# Patient Record
Sex: MALE | Race: WHITE | Employment: OTHER | URBAN - METROPOLITAN AREA
[De-identification: names, ages, dates, MRNs, and addresses within clinical notes are randomized per-mention and may not be internally consistent; named-entity substitution may affect disease eponyms.]

---

## 2017-03-08 PROBLEM — K44.9 HIATAL HERNIA WITH GERD: Status: ACTIVE | Noted: 2017-03-08

## 2017-03-08 PROBLEM — K21.9 HIATAL HERNIA WITH GERD: Status: ACTIVE | Noted: 2017-03-08

## 2017-09-08 PROBLEM — J30.1 SEASONAL ALLERGIC RHINITIS DUE TO POLLEN: Status: ACTIVE | Noted: 2017-09-08

## 2017-09-10 PROBLEM — Z87.19 H/O ESOPHAGEAL SPASM: Status: ACTIVE | Noted: 2017-09-10

## 2018-09-18 PROBLEM — E11.22 TYPE 2 DIABETES MELLITUS WITH STAGE 3 CHRONIC KIDNEY DISEASE, WITHOUT LONG-TERM CURRENT USE OF INSULIN (HCC): Status: RESOLVED | Noted: 2018-09-18 | Resolved: 2018-09-18

## 2018-09-18 PROBLEM — R73.02 IGT (IMPAIRED GLUCOSE TOLERANCE): Status: ACTIVE | Noted: 2018-09-18

## 2018-09-18 PROBLEM — N18.30 TYPE 2 DIABETES MELLITUS WITH STAGE 3 CHRONIC KIDNEY DISEASE, WITHOUT LONG-TERM CURRENT USE OF INSULIN (HCC): Status: ACTIVE | Noted: 2018-09-18

## 2018-09-18 PROBLEM — E11.21 TYPE 2 DIABETES WITH NEPHROPATHY (HCC): Status: ACTIVE | Noted: 2018-09-18

## 2018-09-18 PROBLEM — E11.22 TYPE 2 DIABETES MELLITUS WITH STAGE 3 CHRONIC KIDNEY DISEASE, WITHOUT LONG-TERM CURRENT USE OF INSULIN (HCC): Status: ACTIVE | Noted: 2018-09-18

## 2018-09-18 PROBLEM — N18.30 TYPE 2 DIABETES MELLITUS WITH STAGE 3 CHRONIC KIDNEY DISEASE, WITHOUT LONG-TERM CURRENT USE OF INSULIN (HCC): Status: RESOLVED | Noted: 2018-09-18 | Resolved: 2018-09-18

## 2018-09-20 PROBLEM — M1A.09X0 CHRONIC IDIOPATHIC GOUT OF MULTIPLE SITES: Status: ACTIVE | Noted: 2018-09-20

## 2019-03-16 PROBLEM — Z13.6 SCREENING FOR AAA (ABDOMINAL AORTIC ANEURYSM): Status: ACTIVE | Noted: 2019-03-16

## 2019-03-16 PROBLEM — Z23 NEED FOR SHINGLES VACCINE: Status: ACTIVE | Noted: 2019-03-16

## 2019-03-28 ENCOUNTER — HOSPITAL ENCOUNTER (OUTPATIENT)
Dept: ULTRASOUND IMAGING | Age: 74
Discharge: HOME | End: 2019-03-28
Attending: FAMILY MEDICINE
Payer: COMMERCIAL

## 2019-03-28 DIAGNOSIS — Z13.6 SCREENING FOR AAA (AORTIC ABDOMINAL ANEURYSM): ICD-10-CM

## 2019-03-28 PROCEDURE — 93978 VASCULAR STUDY: CPT

## 2019-09-23 PROBLEM — Z13.6 SCREENING FOR AAA (ABDOMINAL AORTIC ANEURYSM): Status: RESOLVED | Noted: 2019-03-16 | Resolved: 2019-09-23

## 2020-03-22 PROBLEM — Z23 NEED FOR SHINGLES VACCINE: Status: RESOLVED | Noted: 2019-03-16 | Resolved: 2020-03-22

## 2020-03-23 PROBLEM — R60.0 EDEMA OF BOTH LEGS: Status: ACTIVE | Noted: 2020-03-23

## 2020-03-23 PROBLEM — Z28.21 REFUSED PNEUMOCOCCAL VACCINE: Status: ACTIVE | Noted: 2020-03-23

## 2021-03-31 PROBLEM — R60.0 EDEMA OF BOTH LEGS: Status: RESOLVED | Noted: 2020-03-23 | Resolved: 2021-03-31

## 2021-08-24 ENCOUNTER — APPOINTMENT (OUTPATIENT)
Dept: ULTRASOUND IMAGING | Age: 76
DRG: 418 | End: 2021-08-24
Attending: EMERGENCY MEDICINE
Payer: MEDICARE

## 2021-08-24 ENCOUNTER — HOSPITAL ENCOUNTER (INPATIENT)
Age: 76
LOS: 4 days | Discharge: HOME | DRG: 418 | End: 2021-08-28
Attending: EMERGENCY MEDICINE | Admitting: SURGERY
Payer: MEDICARE

## 2021-08-24 DIAGNOSIS — K81.0 ACUTE CHOLECYSTITIS: ICD-10-CM

## 2021-08-24 DIAGNOSIS — K81.9 CHOLECYSTITIS: Primary | ICD-10-CM

## 2021-08-24 PROBLEM — E80.6 HYPERBILIRUBINEMIA: Status: ACTIVE | Noted: 2021-08-24

## 2021-08-24 LAB
ALBUMIN SERPL-MCNC: 4.3 G/DL (ref 3.2–4.6)
ALBUMIN/GLOB SERPL: 1 {RATIO} (ref 1.2–3.5)
ALP SERPL-CCNC: 82 U/L (ref 50–136)
ALT SERPL-CCNC: 26 U/L (ref 12–65)
ANION GAP SERPL CALC-SCNC: 6 MMOL/L (ref 7–16)
AST SERPL-CCNC: 13 U/L (ref 15–37)
BACTERIA URNS QL MICRO: 0 /HPF
BASOPHILS # BLD: 0 K/UL (ref 0–0.2)
BASOPHILS NFR BLD: 0 % (ref 0–2)
BILIRUB SERPL-MCNC: 1.5 MG/DL (ref 0.2–1.1)
BUN SERPL-MCNC: 15 MG/DL (ref 8–23)
CALCIUM SERPL-MCNC: 9.5 MG/DL (ref 8.3–10.4)
CASTS URNS QL MICRO: NORMAL /LPF
CHLORIDE SERPL-SCNC: 106 MMOL/L (ref 98–107)
CO2 SERPL-SCNC: 24 MMOL/L (ref 21–32)
CREAT SERPL-MCNC: 1.36 MG/DL (ref 0.8–1.5)
DIFFERENTIAL METHOD BLD: ABNORMAL
EOSINOPHIL # BLD: 0 K/UL (ref 0–0.8)
EOSINOPHIL NFR BLD: 0 % (ref 0.5–7.8)
EPI CELLS #/AREA URNS HPF: NORMAL /HPF
ERYTHROCYTE [DISTWIDTH] IN BLOOD BY AUTOMATED COUNT: 12.5 % (ref 11.9–14.6)
GLOBULIN SER CALC-MCNC: 4.1 G/DL (ref 2.3–3.5)
GLUCOSE SERPL-MCNC: 156 MG/DL (ref 65–100)
HCT VFR BLD AUTO: 46.6 % (ref 41.1–50.3)
HGB BLD-MCNC: 15.7 G/DL (ref 13.6–17.2)
IMM GRANULOCYTES # BLD AUTO: 0.2 K/UL (ref 0–0.5)
IMM GRANULOCYTES NFR BLD AUTO: 1 % (ref 0–5)
LIPASE SERPL-CCNC: 63 U/L (ref 73–393)
LYMPHOCYTES # BLD: 1 K/UL (ref 0.5–4.6)
LYMPHOCYTES NFR BLD: 6 % (ref 13–44)
MCH RBC QN AUTO: 32.2 PG (ref 26.1–32.9)
MCHC RBC AUTO-ENTMCNC: 33.7 G/DL (ref 31.4–35)
MCV RBC AUTO: 95.5 FL (ref 79.6–97.8)
MONOCYTES # BLD: 1.7 K/UL (ref 0.1–1.3)
MONOCYTES NFR BLD: 10 % (ref 4–12)
NEUTS SEG # BLD: 14.2 K/UL (ref 1.7–8.2)
NEUTS SEG NFR BLD: 83 % (ref 43–78)
NRBC # BLD: 0 K/UL (ref 0–0.2)
PLATELET # BLD AUTO: 183 K/UL (ref 150–450)
PLATELET COMMENTS,PCOM: ADEQUATE
PMV BLD AUTO: 11.4 FL (ref 9.4–12.3)
POTASSIUM SERPL-SCNC: 3.9 MMOL/L (ref 3.5–5.1)
PROT SERPL-MCNC: 8.4 G/DL (ref 6.3–8.2)
RBC # BLD AUTO: 4.88 M/UL (ref 4.23–5.6)
RBC #/AREA URNS HPF: NORMAL /HPF
RBC MORPH BLD: ABNORMAL
SODIUM SERPL-SCNC: 136 MMOL/L (ref 138–145)
WBC # BLD AUTO: 17.1 K/UL (ref 4.3–11.1)
WBC MORPH BLD: ABNORMAL
WBC URNS QL MICRO: NORMAL /HPF

## 2021-08-24 PROCEDURE — 65270000029 HC RM PRIVATE

## 2021-08-24 PROCEDURE — 81015 MICROSCOPIC EXAM OF URINE: CPT

## 2021-08-24 PROCEDURE — 83690 ASSAY OF LIPASE: CPT

## 2021-08-24 PROCEDURE — 80053 COMPREHEN METABOLIC PANEL: CPT

## 2021-08-24 PROCEDURE — 85025 COMPLETE CBC W/AUTO DIFF WBC: CPT

## 2021-08-24 PROCEDURE — 74011250636 HC RX REV CODE- 250/636: Performed by: NURSE PRACTITIONER

## 2021-08-24 PROCEDURE — 76705 ECHO EXAM OF ABDOMEN: CPT

## 2021-08-24 PROCEDURE — 74011000258 HC RX REV CODE- 258: Performed by: NURSE PRACTITIONER

## 2021-08-24 PROCEDURE — 99283 EMERGENCY DEPT VISIT LOW MDM: CPT

## 2021-08-24 RX ORDER — HYDROMORPHONE HYDROCHLORIDE 1 MG/ML
0.5 INJECTION, SOLUTION INTRAMUSCULAR; INTRAVENOUS; SUBCUTANEOUS
Status: DISCONTINUED | OUTPATIENT
Start: 2021-08-24 | End: 2021-08-28 | Stop reason: HOSPADM

## 2021-08-24 RX ORDER — SODIUM CHLORIDE 0.9 % (FLUSH) 0.9 %
5-10 SYRINGE (ML) INJECTION EVERY 8 HOURS
Status: DISCONTINUED | OUTPATIENT
Start: 2021-08-24 | End: 2021-08-28 | Stop reason: HOSPADM

## 2021-08-24 RX ORDER — SODIUM CHLORIDE, SODIUM LACTATE, POTASSIUM CHLORIDE, CALCIUM CHLORIDE 600; 310; 30; 20 MG/100ML; MG/100ML; MG/100ML; MG/100ML
50 INJECTION, SOLUTION INTRAVENOUS CONTINUOUS
Status: DISCONTINUED | OUTPATIENT
Start: 2021-08-24 | End: 2021-08-28 | Stop reason: HOSPADM

## 2021-08-24 RX ORDER — AMLODIPINE BESYLATE 10 MG/1
10 TABLET ORAL DAILY
Status: DISCONTINUED | OUTPATIENT
Start: 2021-08-25 | End: 2021-08-28 | Stop reason: HOSPADM

## 2021-08-24 RX ORDER — SODIUM CHLORIDE 0.9 % (FLUSH) 0.9 %
5-10 SYRINGE (ML) INJECTION AS NEEDED
Status: DISCONTINUED | OUTPATIENT
Start: 2021-08-24 | End: 2021-08-28 | Stop reason: HOSPADM

## 2021-08-24 RX ORDER — ONDANSETRON 2 MG/ML
4 INJECTION INTRAMUSCULAR; INTRAVENOUS
Status: DISCONTINUED | OUTPATIENT
Start: 2021-08-24 | End: 2021-08-28 | Stop reason: HOSPADM

## 2021-08-24 RX ADMIN — PIPERACILLIN SODIUM AND TAZOBACTAM SODIUM 3.38 G: 3; .375 INJECTION, POWDER, LYOPHILIZED, FOR SOLUTION INTRAVENOUS at 22:34

## 2021-08-24 RX ADMIN — Medication 10 ML: at 22:34

## 2021-08-24 RX ADMIN — SODIUM CHLORIDE, SODIUM LACTATE, POTASSIUM CHLORIDE, AND CALCIUM CHLORIDE 100 ML/HR: 600; 310; 30; 20 INJECTION, SOLUTION INTRAVENOUS at 20:05

## 2021-08-24 NOTE — ROUTINE PROCESS
TRANSFER - OUT REPORT:    Verbal report given to Maria Eugenia Peterson on Kathleene Diss  being transferred to 1 for routine progression of care       Report consisted of patients Situation, Background, Assessment and   Recommendations(SBAR). Information from the following report(s) SBAR, ED Summary, [facility] and Recent Results was reviewed with the receiving nurse. Lines:   Peripheral IV 08/24/21 Left Antecubital (Active)   Site Assessment Clean, dry, & intact 08/24/21 1619   Dressing Status Clean, dry, & intact 08/24/21 1619        Opportunity for questions and clarification was provided.       Patient transported with:   [place]

## 2021-08-24 NOTE — ED NOTES
[facility] care upon [facility] change. Patient care transferred at this time.

## 2021-08-24 NOTE — ED TRIAGE NOTES
Pt arrives via POV c/o upper abd discomfort, hx of hernia. Reports pain started Sunday.  Denies n/v/d.

## 2021-08-24 NOTE — ED PROVIDER NOTES
56-year-old male presenting for right upper quadrant and epigastric pain. He has had a couple of episodes of pain over the past 48 hours but the worst was overnight last night into this morning. Tells me it lasted for some hours but is now beginning to ease off little bit. Did not really have any nausea or vomiting with it. He has had these episodes occasionally over the years but never this badly. He took some Tylenol with minimal relief. Does report that he has \"a hernia\" and then points to his epigastric area but still has his gallbladder. The history is provided by the patient. Abdominal Pain   This is a new problem. The current episode started yesterday. The problem occurs hourly. The problem has been gradually improving. The pain is associated with an unknown factor. The pain is located in the epigastric region. The quality of the pain is sharp and shooting. The pain is at a severity of 6/10. The pain is severe. Pertinent negatives include no anorexia, no fever, no belching, no diarrhea, no flatus, no hematochezia, no melena, no nausea, no vomiting, no constipation, no dysuria and no chest pain. Nothing worsens the pain. The pain is relieved by nothing. Past workup includes no CT scan, no ultrasound, no surgery, no esophagogastroduodenoscopy, no UGI, no colonoscopy and no barium enema. The patient's surgical history non-contributory.        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    Essential hypertension, benign 2/4/2015    GERD (gastroesophageal reflux disease) 2/4/2015    Gout 2/4/2015    Hypercholesterolemia 2/4/2015    Hypertrophy (benign) of prostate 2/4/2015    Without urinary obstruction and other lower urinary tract symptoms (LUTS)    Hypothyroidism (acquired) 2/4/2015    Impaired glucose tolerance 2/4/2015    Vitamin D deficiency 2015       Past Surgical History:   Procedure Laterality Date    HX LITHOTRIPSY           Family History:   Problem Relation Age of Onset    Hypertension Mother     No Known Problems Father        Social History     Socioeconomic History    Marital status: SINGLE     Spouse name: Not on file    Number of children: Not on file    Years of education: Not on file    Highest education level: Not on file   Occupational History    Not on file   Tobacco Use    Smoking status: Former Smoker     Packs/day: 0.25     Years: 30.00     Pack years: 7.50     Types: Cigarettes     Quit date: 2005     Years since quittin.3    Smokeless tobacco: Never Used   Substance and Sexual Activity    Alcohol use: Yes     Alcohol/week: 7.0 standard drinks     Types: 7 Standard drinks or equivalent per week     Comment: one glass wine per night    Drug use: No    Sexual activity: Not on file   Other Topics Concern    Not on file   Social History Narrative    Not on file     Social Determinants of Health     Financial Resource Strain:     Difficulty of Paying Living Expenses:    Food Insecurity:     Worried About Running Out of Food in the Last Year:     Ran Out of Food in the Last Year:    Transportation Needs:     Lack of Transportation (Medical):  Lack of Transportation (Non-Medical):    Physical Activity:     Days of Exercise per Week:     Minutes of Exercise per Session:    Stress:     Feeling of Stress :    Social Connections:     Frequency of Communication with Friends and Family:     Frequency of Social Gatherings with Friends and Family:     Attends Yazidism Services:     Active Member of Clubs or Organizations:     Attends Club or Organization Meetings:     Marital Status:    Intimate Partner Violence:     Fear of Current or Ex-Partner:     Emotionally Abused:     Physically Abused:     Sexually Abused:           ALLERGIES: Plavix [clopidogrel]    Review of Systems Constitutional: Negative for fever. Cardiovascular: Negative for chest pain. Gastrointestinal: Positive for abdominal pain. Negative for anorexia, constipation, diarrhea, flatus, hematochezia, melena, nausea and vomiting. Genitourinary: Negative for dysuria. All other systems reviewed and are negative. Vitals:    08/24/21 1618   BP: (!) 145/88   Pulse: (!) 108   Resp: 19   Temp: 98.7 °F (37.1 °C)   SpO2: 96%   Weight: 79.4 kg (175 lb)   Height: 6' 1\" (1.854 m)            Physical Exam  Vitals and nursing note reviewed. Constitutional:       Appearance: He is well-developed. HENT:      Head: Normocephalic and atraumatic. Eyes:      Conjunctiva/sclera: Conjunctivae normal.      Pupils: Pupils are equal, round, and reactive to light. Cardiovascular:      Rate and Rhythm: Normal rate and regular rhythm. Heart sounds: Normal heart sounds. Pulmonary:      Effort: Pulmonary effort is normal.      Breath sounds: Normal breath sounds. Abdominal:      General: Bowel sounds are normal.      Palpations: Abdomen is soft. Tenderness: There is abdominal tenderness in the epigastric area. Musculoskeletal:         General: No deformity. Normal range of motion. Cervical back: Normal range of motion and neck supple. Skin:     General: Skin is warm and dry. Neurological:      Mental Status: He is alert and oriented to person, place, and time. Cranial Nerves: No cranial nerve deficit. Psychiatric:         Behavior: Behavior normal.          MDM  Number of Diagnoses or Management Options  Cholecystitis  Diagnosis management comments: 54-year-old male presenting for epigastric pain. Given the colicky nature I am concerned is biliary colic versus cholecystitis versus choledocholithiasis. Basic labs drawn and will get an ultrasound of the gallbladder.        Amount and/or Complexity of Data Reviewed  Clinical lab tests: ordered and reviewed (Results for orders placed or performed during the hospital encounter of 08/24/21  -CBC WITH AUTOMATED DIFF       Result                      Value             Ref Range           WBC                         17.1 (H)          4.3 - 11.1 K*       RBC                         4.88              4.23 - 5.6 M*       HGB                         15.7              13.6 - 17.2 *       HCT                         46.6              41.1 - 50.3 %       MCV                         95.5              79.6 - 97.8 *       MCH                         32.2              26.1 - 32.9 *       MCHC                        33.7              31.4 - 35.0 *       RDW                         12.5              11.9 - 14.6 %       PLATELET                    183               150 - 450 K/*       MPV                         11.4              9.4 - 12.3 FL       ABSOLUTE NRBC               0.00              0.0 - 0.2 K/*       NEUTROPHILS                 83 (H)            43 - 78 %           LYMPHOCYTES                 6 (L)             13 - 44 %           MONOCYTES                   10                4.0 - 12.0 %        EOSINOPHILS                 0 (L)             0.5 - 7.8 %         BASOPHILS                   0                 0.0 - 2.0 %         IMMATURE GRANULOCYTES       1                 0.0 - 5.0 %         ABS. NEUTROPHILS            14.2 (H)          1.7 - 8.2 K/*       ABS. LYMPHOCYTES            1.0               0.5 - 4.6 K/*       ABS. MONOCYTES              1.7 (H)           0.1 - 1.3 K/*       ABS. EOSINOPHILS            0.0               0.0 - 0.8 K/*       ABS. BASOPHILS              0.0               0.0 - 0.2 K/*       ABS. IMM.  GRANS.            0.2               0.0 - 0.5 K/*       RBC COMMENTS                                                  NORMOCYTIC/NORMOCHROMIC       WBC COMMENTS                                                  Result Confirmed By Smear       PLATELET COMMENTS           ADEQUATE                              DF                          AUTOMATED -METABOLIC PANEL, COMPREHENSIVE       Result                      Value             Ref Range           Sodium                      136 (L)           138 - 145 mm*       Potassium                   3.9               3.5 - 5.1 mm*       Chloride                    106               98 - 107 mmo*       CO2                         24                21 - 32 mmol*       Anion gap                   6 (L)             7 - 16 mmol/L       Glucose                     156 (H)           65 - 100 mg/*       BUN                         15                8 - 23 MG/DL        Creatinine                  1.36              0.8 - 1.5 MG*       GFR est AA                  >60               >60 ml/min/1*       GFR est non-AA              54 (L)            >60 ml/min/1*       Calcium                     9.5               8.3 - 10.4 M*       Bilirubin, total            1.5 (H)           0.2 - 1.1 MG*       ALT (SGPT)                  26                12 - 65 U/L         AST (SGOT)                  13 (L)            15 - 37 U/L         Alk. phosphatase            82                50 - 136 U/L        Protein, total              8.4 (H)           6.3 - 8.2 g/*       Albumin                     4.3               3.2 - 4.6 g/*       Globulin                    4.1 (H)           2.3 - 3.5 g/*       A-G Ratio                   1.0 (L)           1.2 - 3.5      -LIPASE       Result                      Value             Ref Range           Lipase                      63 (L)            73 - 393 U/L   )  Tests in the radiology section of CPT®: ordered and reviewed (US ABD LTD    Result Date: 8/24/2021  RIGHT UPPER QUADRANT ULTRASOUND 8/24/2021 HISTORY: ruq pain that comes and goes; ; TECHNIQUE: Sonographic imaging of the right upper quadrant was performed. COMPARISON: None FINDINGS: The pancreatic head is not well visualized because it is partially obscured by bowel gas.  The gallbladder is distended and contains multiple shadowing stones. Edema is present on the gallbladder wall which is thickened up to 6 mm. The sonographic Winkler's sign is absent. The common bile duct is 2.7 mm in diameter. There is no biliary ductal dilatation. There is a cyst in the left hepatic lobe that measures 3.6 cm in diameter. The right kidney is 10 cm in length. There is no hydronephrosis. There are multiple nonobstructing stones in the right renal collecting system. The distal aorta is 1.8cm in diameter. The IVC is patent. 1. Cholelithiasis with edematous thickened gallbladder wall. Findings are concerning for acute cholecystitis. 2. Nephrolithiasis.     )  Tests in the medicine section of CPT®: ordered and reviewed  Discuss the patient with other providers: yes (Consulted surgery with the above information of elevated white count, elevated bilirubin and ultrasound findings.)  Independent visualization of images, tracings, or specimens: yes (Reviewed ultrasound)    Risk of Complications, Morbidity, and/or Mortality  Presenting problems: high  Diagnostic procedures: high  Management options: high  General comments: It appears that the surgery team has decided to admit the patient for acute cholecystitis. I personally reviewed the patient's vital signs, laboratory tests, and/or radiological findings. I discussed these findings with the patient and their significance. I answered all questions and explained that given these findings there is significant concern for increased morbidity and/or mortality without immediate intervention.   As a result, I recommended admission to the hospital, consulted the appropriate service, and transitioned care to that service in improved condition      Patient Progress  Patient progress: improved    ED Course as of Aug 24 1907   June Aug 24, 2021   1907 Consulted surgery due to concern for acute cholecystitis given elevated white count, good story and elevated bilirubin with ultrasound findings    [JS]      ED Course User Index  [JS] Pema Mcallister MD       Procedures

## 2021-08-25 LAB
ALBUMIN SERPL-MCNC: 3.5 G/DL (ref 3.2–4.6)
ALBUMIN/GLOB SERPL: 0.9 {RATIO} (ref 1.2–3.5)
ALP SERPL-CCNC: 77 U/L (ref 50–136)
ALT SERPL-CCNC: 35 U/L (ref 12–65)
ANION GAP SERPL CALC-SCNC: 4 MMOL/L (ref 7–16)
AST SERPL-CCNC: 29 U/L (ref 15–37)
BILIRUB SERPL-MCNC: 2.1 MG/DL (ref 0.2–1.1)
BUN SERPL-MCNC: 18 MG/DL (ref 8–23)
CALCIUM SERPL-MCNC: 9 MG/DL (ref 8.3–10.4)
CHLORIDE SERPL-SCNC: 108 MMOL/L (ref 98–107)
CO2 SERPL-SCNC: 26 MMOL/L (ref 21–32)
CREAT SERPL-MCNC: 1.41 MG/DL (ref 0.8–1.5)
ERYTHROCYTE [DISTWIDTH] IN BLOOD BY AUTOMATED COUNT: 12.7 % (ref 11.9–14.6)
GLOBULIN SER CALC-MCNC: 3.7 G/DL (ref 2.3–3.5)
GLUCOSE SERPL-MCNC: 137 MG/DL (ref 65–100)
HCT VFR BLD AUTO: 43.3 % (ref 41.1–50.3)
HGB BLD-MCNC: 14.4 G/DL (ref 13.6–17.2)
MCH RBC QN AUTO: 31.7 PG (ref 26.1–32.9)
MCHC RBC AUTO-ENTMCNC: 33.3 G/DL (ref 31.4–35)
MCV RBC AUTO: 95.4 FL (ref 79.6–97.8)
NRBC # BLD: 0 K/UL (ref 0–0.2)
PLATELET # BLD AUTO: 159 K/UL (ref 150–450)
PMV BLD AUTO: 11.8 FL (ref 9.4–12.3)
POTASSIUM SERPL-SCNC: 3.9 MMOL/L (ref 3.5–5.1)
PROT SERPL-MCNC: 7.2 G/DL (ref 6.3–8.2)
RBC # BLD AUTO: 4.54 M/UL (ref 4.23–5.6)
SODIUM SERPL-SCNC: 138 MMOL/L (ref 136–145)
WBC # BLD AUTO: 13.6 K/UL (ref 4.3–11.1)

## 2021-08-25 PROCEDURE — 74011250637 HC RX REV CODE- 250/637: Performed by: NURSE PRACTITIONER

## 2021-08-25 PROCEDURE — 85027 COMPLETE CBC AUTOMATED: CPT

## 2021-08-25 PROCEDURE — 74011000258 HC RX REV CODE- 258: Performed by: NURSE PRACTITIONER

## 2021-08-25 PROCEDURE — 36415 COLL VENOUS BLD VENIPUNCTURE: CPT

## 2021-08-25 PROCEDURE — 99223 1ST HOSP IP/OBS HIGH 75: CPT | Performed by: SURGERY

## 2021-08-25 PROCEDURE — 80053 COMPREHEN METABOLIC PANEL: CPT

## 2021-08-25 PROCEDURE — 65270000029 HC RM PRIVATE

## 2021-08-25 PROCEDURE — 74011250636 HC RX REV CODE- 250/636: Performed by: NURSE PRACTITIONER

## 2021-08-25 RX ORDER — ACETAMINOPHEN 325 MG/1
650 TABLET ORAL
Status: DISCONTINUED | OUTPATIENT
Start: 2021-08-25 | End: 2021-08-28 | Stop reason: HOSPADM

## 2021-08-25 RX ORDER — PANTOPRAZOLE SODIUM 40 MG/1
40 TABLET, DELAYED RELEASE ORAL
Status: DISCONTINUED | OUTPATIENT
Start: 2021-08-25 | End: 2021-08-28 | Stop reason: HOSPADM

## 2021-08-25 RX ORDER — VALSARTAN 320 MG/1
320 TABLET ORAL DAILY
Status: DISCONTINUED | OUTPATIENT
Start: 2021-08-25 | End: 2021-08-28 | Stop reason: HOSPADM

## 2021-08-25 RX ADMIN — PANTOPRAZOLE SODIUM 40 MG: 40 TABLET, DELAYED RELEASE ORAL at 09:25

## 2021-08-25 RX ADMIN — PIPERACILLIN SODIUM AND TAZOBACTAM SODIUM 3.38 G: 3; .375 INJECTION, POWDER, LYOPHILIZED, FOR SOLUTION INTRAVENOUS at 19:59

## 2021-08-25 RX ADMIN — PIPERACILLIN SODIUM AND TAZOBACTAM SODIUM 3.38 G: 3; .375 INJECTION, POWDER, LYOPHILIZED, FOR SOLUTION INTRAVENOUS at 12:53

## 2021-08-25 RX ADMIN — HYDROMORPHONE HYDROCHLORIDE 0.5 MG: 1 INJECTION, SOLUTION INTRAMUSCULAR; INTRAVENOUS; SUBCUTANEOUS at 01:30

## 2021-08-25 RX ADMIN — LEVOTHYROXINE SODIUM 137 MCG: 0.11 TABLET ORAL at 05:55

## 2021-08-25 RX ADMIN — Medication 10 ML: at 05:55

## 2021-08-25 RX ADMIN — AMLODIPINE BESYLATE 10 MG: 10 TABLET ORAL at 08:15

## 2021-08-25 RX ADMIN — Medication 10 ML: at 13:48

## 2021-08-25 RX ADMIN — PIPERACILLIN SODIUM AND TAZOBACTAM SODIUM 3.38 G: 3; .375 INJECTION, POWDER, LYOPHILIZED, FOR SOLUTION INTRAVENOUS at 04:39

## 2021-08-25 RX ADMIN — VALSARTAN 320 MG: 320 TABLET, FILM COATED ORAL at 09:25

## 2021-08-25 RX ADMIN — Medication 10 ML: at 20:10

## 2021-08-25 NOTE — H&P
H&P/Consult Note/Progress Note/Office Note:   Abiel Burt  MRN: [id number]  UIA:[id number]  Age:76 y.o.    HPI: Abiel Burt is a 67 y.o. male who we are asked by Dr. Jody Huynh to see for concerns for acute cholecystitis. The patient has a PMHx as listed below. He presented with complaints of abdominal pain. The pain is located in the RUQ without radiation. Patient describes the pain as intermittent and sharp. Additional symptoms include nausea and vomiting. Patient denies diarrhea, constipation, fever and chills. An U/S was obtained which showed cholelithiasis with edematous thickened gallbladder. LFTs WNL but tbili elevated at 1.5 on admission now up to 2.1. Patient describes to former \"attacks\" that happened [employer]".      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    Essential hypertension, benign 2/4/2015    GERD (gastroesophageal reflux disease) 2/4/2015    Gout 2/4/2015    Hypercholesterolemia 2/4/2015    Hypertrophy (benign) of prostate 2/4/2015    Without urinary obstruction and other lower urinary tract symptoms (LUTS)    Hypothyroidism (acquired) 2/4/2015    Impaired glucose tolerance 2/4/2015    Vitamin D deficiency 2/4/2015     Past Surgical History:   Procedure Laterality Date    HX LITHOTRIPSY       Current Facility-Administered Medications   Medication Dose Route Frequency    acetaminophen (TYLENOL) tablet 650 mg  650 mg Oral Q4H PRN    sodium chloride (NS) flush 5-10 mL  5-10 mL IntraVENous Q8H    sodium chloride (NS) flush 5-10 mL  5-10 mL IntraVENous PRN    lactated Ringers infusion  100 mL/hr IntraVENous CONTINUOUS    piperacillin-tazobactam (ZOSYN) 3.375 g in 0.9% sodium chloride (MBP/ADV) 100 mL MBP  3.375 g IntraVENous Q8H    HYDROmorphone (DILAUDID) injection 0.5 mg  0.5 mg IntraVENous Q3H PRN  ondansetron (ZOFRAN) injection 4 mg  4 mg IntraVENous Q4H PRN    amLODIPine (NORVASC) tablet 10 mg  10 mg Oral DAILY    levothyroxine (SYNTHROID) tablet 137 mcg  137 mcg Oral 6am     Plavix [clopidogrel]  Social History     Socioeconomic History    Marital status: SINGLE     Spouse name: Not on file    Number of children: Not on file    Years of education: Not on file    Highest education level: Not on file   Tobacco Use    Smoking status: Former Smoker     Packs/day: 0.25     Years: 30.00     Pack years: 7.50     Types: Cigarettes     Quit date: 2005     Years since quittin.3    Smokeless tobacco: Never Used   Substance and Sexual Activity    Alcohol use: Yes     Alcohol/week: 7.0 standard drinks     Types: 7 Standard drinks or equivalent per week     Comment: one glass wine per night    Drug use: No     Social Determinants of Health     Financial Resource Strain:     Difficulty of Paying Living Expenses:    Food Insecurity:     Worried About Running Out of Food in the Last Year:     Ran Out of Food in the Last Year:    Transportation Needs:     Lack of Transportation (Medical):      Lack of Transportation (Non-Medical):    Physical Activity:     Days of Exercise per Week:     Minutes of Exercise per Session:    Stress:     Feeling of Stress :    Social Connections:     Frequency of Communication with Friends and Family:     Frequency of Social Gatherings with Friends and Family:     Attends Islam Services:     Active Member of Clubs or Organizations:     Attends Club or Organization Meetings:     Marital Status:      Social History     Tobacco Use   Smoking Status Former Smoker    Packs/day: 0.25    Years: 30.00    Pack years: 7.50    Types: Cigarettes    Quit date: 2005    Years since quittin.3   Smokeless Tobacco Never Used     Family History   Problem Relation Age of Onset    Hypertension Mother     No Known Problems Father      ROS: The patient has no difficulty with chest pain or shortness of breath. No fever or chills. Comprehensive review of systems was otherwise unremarkable except as noted above. Physical Exam:   Visit Vitals  /71 (BP 1 Location: Right upper arm, BP Patient Position: At rest)   Pulse 82   Temp 99.9 °F (37.7 °C)   Resp 18   Ht 6' 1\" (1.854 m)   Wt 175 lb (79.4 kg)   SpO2 92%   BMI 23.09 kg/m²     Constitutional: Alert, oriented, cooperative patient in no acute distress; appears stated age    Eyes:Sclera are clear. EOMs intact  ENMT: no external lesions gross hearing normal; no obvious neck masses, no ear or lip lesions, nares normal  CV: RRR. Normal perfusion  Resp: No JVD. Breathing is  non-labored; no audible wheezing. GI: soft and non-distended, ttp RUQ, Positive Winkler's  Musculoskeletal: unremarkable with normal function. No embolic signs or cyanosis. Neuro:  Oriented; moves all 4; no focal deficits  Psychiatric: normal affect and mood, no memory impairment    Recent vitals (if inpt):  Patient Vitals for the past 24 hrs:   BP Temp Pulse Resp SpO2 Height Weight   08/25/21 0748 126/71 99.9 °F (37.7 °C) 82 18 92 %     08/25/21 0349 127/72 99 °F (37.2 °C) 80 18 94 %     08/24/21 2357 (!) 147/80 99.4 °F (37.4 °C) 82 18 95 %     08/24/21 2048 (!) 165/72 98.6 °F (37 °C) (!) 106 20 94 %     08/24/21 1950 (!) 164/77  94  96 %     08/24/21 1618 (!) 145/88 98.7 °F (37.1 °C) (!) 108 19 96 % 6' 1\" (1.854 m) 175 lb (79.4 kg)       Labs:  Recent Labs     08/25/21  0449 08/24/21  1620 08/24/21  1620   WBC 13.6*   < > 17.1*   HGB 14.4   < > 15.7      < > 183      < > 136*   K 3.9   < > 3.9   *   < > 106   CO2 26   < > 24   BUN 18   < > 15   CREA 1.41   < > 1.36   *   < > 156*   TBILI 2.1*   < > 1.5*   ALT 35   < > 26   AP 77   < > 82   LPSE  --   --  63*    < > = values in this interval not displayed.        Lab Results   Component Value Date/Time    WBC 13.6 (H) 08/25/2021 04:49 AM    HGB 14.4 08/25/2021 04:49 AM    PLATELET 061 38/38/6620 04:49 AM    Sodium 138 08/25/2021 04:49 AM    Potassium 3.9 08/25/2021 04:49 AM    Chloride 108 (H) 08/25/2021 04:49 AM    CO2 26 08/25/2021 04:49 AM    BUN 18 08/25/2021 04:49 AM    Creatinine 1.41 08/25/2021 04:49 AM    Glucose 137 (H) 08/25/2021 04:49 AM    Bilirubin, total 2.1 (H) 08/25/2021 04:49 AM    Bilirubin, direct 0.24 03/01/2017 09:23 AM    ALT (SGPT) 35 08/25/2021 04:49 AM    Alk. phosphatase 77 08/25/2021 04:49 AM    Lipase 63 (L) 08/24/2021 04:20 PM       I reviewed recent labs and recent radiologic studies. US Results (most recent):  Results from [place] encounter on 08/24/21    US ABD LTD    Narrative  RIGHT UPPER QUADRANT ULTRASOUND 8/24/2021    HISTORY: ruq pain that comes and goes; ;    TECHNIQUE: Sonographic imaging of the right upper quadrant was performed. COMPARISON: None    FINDINGS:    The pancreatic head is not well visualized because it is partially obscured by  bowel gas. The gallbladder is distended and contains multiple shadowing stones. Edema is  present on the gallbladder wall which is thickened up to 6 mm. The sonographic  Winkler's sign is absent. The common bile duct is 2.7 mm in diameter. There is no biliary ductal dilatation. There is a cyst in the left hepatic lobe  that measures 3.6 cm in diameter. The right kidney is 10 cm in length. There is no hydronephrosis. There are  multiple nonobstructing stones in the right renal collecting system. The distal aorta is 1.8cm in diameter. The IVC is patent. Impression  1. Cholelithiasis with edematous thickened gallbladder wall. Findings are  concerning for acute cholecystitis. 2. Nephrolithiasis. CT Results (most recent):  No results found for this or any previous visit. I independently reviewed radiology images for studies I described above or studies I have ordered.    Admission date (for inpatients): 8/24/2021   * No surgery found *  * No surgery found *    ASSESSMENT/PLAN:  Problem List  Date Reviewed: 3/31/2021        Codes Class Noted    Acute cholecystitis ICD-10-CM: K81.0  ICD-9-CM: 575.0  8/24/2021        Hyperbilirubinemia ICD-10-CM: E80.6  ICD-9-CM: 782.4  8/24/2021        Idiopathic chronic gout of multiple sites without tophus ICD-10-CM: M1A. 09X0  ICD-9-CM: 274.02  9/20/2018        IGT (impaired glucose tolerance) ICD-10-CM: R73.02  ICD-9-CM: 790.22  9/18/2018    Overview Signed 9/18/2018  8:53 PM by Bonnie Gaviria DO     Changed DIABETES DX to IGT 9/18/18             H/O esophageal spasm ICD-10-CM: Z87.19  ICD-9-CM: V12.79  9/10/2017        Seasonal allergic rhinitis due to pollen ICD-10-CM: J30.1  ICD-9-CM: 477.0  9/8/2017        Hiatal hernia with GERD ICD-10-CM: K21.9, K44.9  ICD-9-CM: 530.81, 553.3  3/8/2017        Generalized anxiety disorder ICD-10-CM: F41.1  ICD-9-CM: 300.02  2/4/2015        Calculus of kidney ICD-10-CM: N20.0  ICD-9-CM: 592.0  2/4/2015        Stage 3a chronic kidney disease ([facility] [email or web address] [email or web address].) ICD-10-CM: N18.31  ICD-9-CM: 585.3  2/4/2015        Eczema ICD-10-CM: L30.9  ICD-9-CM: 692.9  2/4/2015        Gastroesophageal reflux disease without esophagitis ICD-10-CM: K21.9  ICD-9-CM: 530.81  2/4/2015        Hypercholesterolemia ICD-10-CM: E78.00  ICD-9-CM: 272.0  2/4/2015        Benign essential HTN ICD-10-CM: I10  ICD-9-CM: 401.1  2/4/2015        Benign prostatic hyperplasia with nocturia ICD-10-CM: N40.1, R35.1  ICD-9-CM: 600.01, 788.43  2/4/2015        Hypothyroidism (acquired) ICD-10-CM: E03.9  ICD-9-CM: 244.9  2/4/2015        Vitamin D deficiency ICD-10-CM: E55.9  ICD-9-CM: 268.9  2/4/2015            Active Problems:    Acute cholecystitis (8/24/2021)      Hyperbilirubinemia (8/24/2021)         Consult GI for elevated tbili  NPO  IVF  ABX  Trend labs  Will need interval CCY    Signed:  Alisha Roque NP   Patient is admitted with acute cholecystitis.   Patient began having right upper quadrant abdominal pain beginning 2 days ago. The patient thought initially that it was his hiatal hernia. Ultrasound in the emergency department reveals thickened gallbladder wall and pericholecystic fluid findings the suggestive of acute cholecystitis. There was no biliary ductal dilatation. Patient does have elevated bilirubin of 2.1. He is being admitted with acute cholecystitis. I agree with Zosyn 3.375 mg IV every 8. We will repeat CMP to evaluate bilirubin level tomorrow. We will consider MRCP if trending upward. Appreciate GI consult. He is scheduled at this time for Friday laparoscopic cholecystectomy with intraoperative cholangiogram.  We will resume a low-fat diet at this time. I have personally performed a face-to-face diagnostic evaluation and management  service on this patient. I have independently seen the patient. I have independently obtained the above history from the patient/family. I have independently examined the patient with above findings. I have independently reviewed data/labs for this patient and developed the above plan of care.     [facility] [id number] [facility]

## 2021-08-25 NOTE — CONSULTS
[facility] Consult Note       Primary GI Physician: Dr. Luz Maria Harding     Referring Provider:  Sarah Fernandez NP    Consult Date:  8/25/2021    Admit Date:  8/24/2021    Chief Complaint:  Elevated TB    Subjective:     History of Present Illness:  Patient is a 67 y.o. male with PMH including but not limited to GERD, HOLD, Anxiety, CKD , who is seen in consultation at the request of Sarah Fernandez NP for elevated TB. Mr. Kiet Jamison has been admitted overnight for acute cholecystitis. Patient presented with complaints of abdominal pain located in the RUQ without radiation. Patient describes the pain as intermittent and sharp. Additional symptoms include nausea and vomiting. Patient denies diarrhea, constipation, fever and chills. An U/S was obtained which showed cholelithiasis with edematous thickened gallbladder. Labs on adission with WBC 17.1, HGB 15, , TB 1.5, AST 13, ALT 26, AP 82, Lipase 63. TB now up to 2.1 but AP and transaminases are WNL. US on admission with findings of CBD of 2.7mm, cholelithiasis with edematous thickened gallbladder wall, concern for acute cholecystitis. He has been started on Zosyn and is NPO. Mr. Kiet Jamison denies any abdominal pain, nausea, or vomiting this morning. He has not had any recent changes in bowel pattern. He has never seen GI before. Denies any prior Colonoscopy. He recalls having a negative cologuard. Last LFT to review are from 2017 and were normal. Mr. Kiet Jamison does report drinking 1 glass of wine daily for years. Patient describes to former \"attacks\" that happened [employer]".          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    Essential hypertension, benign 2/4/2015    GERD (gastroesophageal reflux disease) 2/4/2015    Gout 2/4/2015    Hypercholesterolemia 2/4/2015    Hypertrophy (benign) of prostate 2/4/2015    Without urinary obstruction and other lower urinary tract symptoms (LUTS)    Hypothyroidism (acquired) 2/4/2015    Impaired glucose tolerance 2/4/2015    Vitamin D deficiency 2/4/2015       PSH:  Past Surgical History:   Procedure Laterality Date    HX LITHOTRIPSY         Allergies: Allergies   Allergen Reactions    Plavix [Clopidogrel] Other (comments)     Increased risk of bleeding       Home Medications:  Prior to Admission medications    Medication Sig Start Date End Date Taking? Authorizing Provider   levothyroxine (Unithroid) 137 mcg tablet Take 137 mcg by mouth Daily (before breakfast). 5/16/21  Yes Nancy HEREDIA, DO   amLODIPine (NORVASC) 10 mg tablet TAKE 1 TABLET BY MOUTH EVERY DAY 3/17/21  Yes Nancy HEREDIA, DO   irbesartan (AVAPRO) 300 mg tablet TAKE 1 TABLET BY MOUTH EVERY DAY 3/17/21  Yes Nancy HEREDIA, DO   finasteride (PROSCAR) 5 mg tablet TAKE 1 TABLET BY MOUTH EVERY DAY 3/17/21  Yes Nancy HEREDIA, DO   omeprazole (PRILOSEC) 20 mg capsule TAKE 1 CAPSULE BY MOUTH EVERY DAY 10/8/20  Yes Nancy HEREDIA, DO   colchicine (Colcrys) 0.6 mg tablet Take 1 Tab by mouth daily. Take 2 pills on first day, then 1 pill daily thereafter. Indications: acute inflammation of the joints due to gout attack 3/17/20  Yes Elliott Lorenzo,    omega-3 fatty acids-vitamin e (FISH OIL) 1,000 mg cap Take 1 Cap by mouth. Yes Provider, Historical   Cholecalciferol, Vitamin D3, (VITAMIN D3) 1,000 unit cap Take  by mouth.    Yes Provider, Historical       Hospital Medications:  Current Facility-Administered Medications   Medication Dose Route Frequency    acetaminophen (TYLENOL) tablet 650 mg  650 mg Oral Q4H PRN    valsartan (DIOVAN) tablet 320 mg  320 mg Oral DAILY    pantoprazole (PROTONIX) tablet 40 mg  40 mg Oral ACB    sodium chloride (NS) flush 5-10 mL  5-10 mL IntraVENous Q8H    sodium chloride (NS) flush 5-10 mL  5-10 mL IntraVENous PRN    lactated Ringers infusion  100 mL/hr IntraVENous CONTINUOUS    piperacillin-tazobactam (ZOSYN) 3.375 g in 0.9% sodium chloride (MBP/ADV) 100 mL MBP  3.375 g IntraVENous Q8H    HYDROmorphone (DILAUDID) injection 0.5 mg  0.5 mg IntraVENous Q3H PRN    ondansetron (ZOFRAN) injection 4 mg  4 mg IntraVENous Q4H PRN    amLODIPine (NORVASC) tablet 10 mg  10 mg Oral DAILY    levothyroxine (SYNTHROID) tablet 137 mcg  137 mcg Oral 6am       Social History:  Social History     Tobacco Use    Smoking status: Former Smoker     Packs/day: 0.25     Years: 30.00     Pack years: 7.50     Types: Cigarettes     Quit date: 2005     Years since quittin.3    Smokeless tobacco: Never Used   Substance Use Topics    Alcohol use: Yes     Alcohol/week: 7.0 standard drinks     Types: 7 Standard drinks or equivalent per week     Comment: one glass wine per night       Pt denies any history of drug use, blood transfusions, or tattoos. Family History:  Family History   Problem Relation Age of Onset    Hypertension Mother     No Known Problems Father        Review of Systems:  A detailed 10 system ROS is obtained, with pertinent positives as listed above. All others are negative. Diet:  NPO    Objective:     Physical Exam:  Vitals:  Visit Vitals  /71 (BP 1 Location: Right upper arm, BP Patient Position: At rest)   Pulse 82   Temp 99.9 °F (37.7 °C)   Resp 18   Ht 6' 1\" (1.854 m)   Wt 79.4 kg (175 lb)   SpO2 92%   BMI 23.09 kg/m²     Gen:  Pt is alert, cooperative, no acute distress  Skin:  Extremities and face reveal no rashes. HEENT: Sclerae anicteric. Extra-occular muscles are intact. No oral ulcers. No abnormal pigmentation of the lips. The neck is supple. Cardiovascular: Regular rate and rhythm. No murmurs, gallops, or rubs. Respiratory:  Comfortable breathing with no accessory muscle use. Clear breath sounds anteriorly with no wheezes, rales, or rhonchi.   GI:  Abdomen nondistended, soft, and nontender. Normal active bowel sounds. No enlargement of the liver or spleen. No masses palpable. Rectal:  Deferred  Musculoskeletal:  No pitting edema of the lower legs. Neurological:  Gross memory appears intact. Patient is alert and oriented. Psychiatric:  Mood appears appropriate with judgement intact. Lymphatic:  No cervical or supraclavicular adenopathy. Laboratory:    Recent Labs     08/25/21  0449 08/24/21  1620   WBC 13.6* 17.1*   HGB 14.4 15.7   HCT 43.3 46.6    183   MCV 95.4 95.5    136*   K 3.9 3.9   * 106   CO2 26 24   BUN 18 15   CREA 1.41 1.36   CA 9.0 9.5   * 156*   AP 77 82   AST 29 13*   ALT 35 26   TBILI 2.1* 1.5*   ALB 3.5 4.3   TP 7.2 8.4*   LPSE  --  63*      RUQ US 8/24/2021       FINDINGS:      The pancreatic head is not well visualized because it is partially obscured by  bowel gas.     The gallbladder is distended and contains multiple shadowing stones. Edema is  present on the gallbladder wall which is thickened up to 6 mm. The sonographic  Winkler's sign is absent. The common bile duct is 2.7 mm in diameter.       There is no biliary ductal dilatation. There is a cyst in the left hepatic lobe  that measures 3.6 cm in diameter.     The right kidney is 10 cm in length. There is no hydronephrosis. There are  multiple nonobstructing stones in the right renal collecting system.     The distal aorta is 1.8cm in diameter. The IVC is patent.        IMPRESSION     1. Cholelithiasis with edematous thickened gallbladder wall. Findings are  concerning for acute cholecystitis.     2. Nephrolithiasis. Assessment:     Active Problems:    Acute cholecystitis (8/24/2021)      Hyperbilirubinemia (8/24/2021)      Patient is a 67 y.o. male with PMH including but not limited to GERD, HOLD, Anxiety, CKD , who is seen in consultation at the request of Keely Espana NP for elevated TB. Admitted for acute cholecystitis.        Labs on [facility] with WBC 17.1, HGB 15, , TB 1.5, AST 13, ALT 26, AP 82, Lipase 63. TB now up to 2.1 but AP and transaminases are WNL. US on admission with findings of CBD of 2.7mm, cholelithiasis with edematous thickened gallbladder wall, concern for acute cholecystitis. He has been started on Zosyn and is NPO. Plan:     Antibiotics per primary   We do not recommend ERCP given the normal CBD size and normal AP. Recommend IOC with cholecystectomy. Then, ERCP, if needed. Thank you for this kind consultation. Sivan Phelps NP    Patient is seen and examined in collaboration with Dr. Nevaeh Camacho. .  Assessment and plan as per Dr. Nevaeh Camacho.

## 2021-08-25 NOTE — PROGRESS NOTES
08/24/21 2100   Dual Skin Pressure Injury Assessment   Dual Skin Pressure Injury Assessment WDL   Second Care Provider (Based on [address] [place] [address]) [facility]   Skin Integumentary   Skin Integumentary (WDL) WDL    Pressure  Injury Documentation No Pressure Injury Noted-Pressure Ulcer Prevention Initiated   Skin Color Appropriate for ethnicity   Skin Condition/Temp Warm;Dry   Skin Integrity Intact   Wound Prevention and Protection Methods   Orientation of Wound Prevention Posterior   Location of Wound Prevention Sacrum/Coccyx   Dressing Present  No   Wound Offloading (Prevention Methods) Bed, pressure reduction mattress;Blankets;Pillows;Repositioning     No skin issues noted.

## 2021-08-25 NOTE — PROGRESS NOTES
TRANSFER - IN REPORT:    Verbal report received from [facility] on [place]  being received from ED for routine progression of care      Report consisted of patients Situation, Background, Assessment and   Recommendations(SBAR). Information from the following report(s) SBAR was reviewed with the receiving nurse. Opportunity for questions and clarification was provided. Assessment completed upon patients arrival to unit and care assumed.

## 2021-08-26 ENCOUNTER — ANESTHESIA EVENT (OUTPATIENT)
Dept: SURGERY | Age: 76
DRG: 418 | End: 2021-08-26
Payer: MEDICARE

## 2021-08-26 LAB
ALBUMIN SERPL-MCNC: 3.1 G/DL (ref 3.2–4.6)
ALBUMIN/GLOB SERPL: 0.8 {RATIO} (ref 1.2–3.5)
ALP SERPL-CCNC: 83 U/L (ref 50–136)
ALT SERPL-CCNC: 37 U/L (ref 12–65)
ANION GAP SERPL CALC-SCNC: 6 MMOL/L (ref 7–16)
AST SERPL-CCNC: 18 U/L (ref 15–37)
BILIRUB SERPL-MCNC: 1.3 MG/DL (ref 0.2–1.1)
BUN SERPL-MCNC: 20 MG/DL (ref 8–23)
CALCIUM SERPL-MCNC: 8.9 MG/DL (ref 8.3–10.4)
CHLORIDE SERPL-SCNC: 109 MMOL/L (ref 98–107)
CO2 SERPL-SCNC: 24 MMOL/L (ref 21–32)
COVID-19 RAPID TEST, COVR: NOT DETECTED
CREAT SERPL-MCNC: 1.33 MG/DL (ref 0.8–1.5)
GLOBULIN SER CALC-MCNC: 3.9 G/DL (ref 2.3–3.5)
GLUCOSE SERPL-MCNC: 91 MG/DL (ref 65–100)
POTASSIUM SERPL-SCNC: 4.2 MMOL/L (ref 3.5–5.1)
PROT SERPL-MCNC: 7 G/DL (ref 6.3–8.2)
SARS-COV-2, COV2: NORMAL
SARS-COV-2, COV2: NOT DETECTED
SODIUM SERPL-SCNC: 139 MMOL/L (ref 138–145)
SOURCE, COVRS: NORMAL
SPECIMEN SOURCE, FCOV2M: NORMAL

## 2021-08-26 PROCEDURE — 74011250637 HC RX REV CODE- 250/637: Performed by: NURSE PRACTITIONER

## 2021-08-26 PROCEDURE — 36415 COLL VENOUS BLD VENIPUNCTURE: CPT

## 2021-08-26 PROCEDURE — 80053 COMPREHEN METABOLIC PANEL: CPT

## 2021-08-26 PROCEDURE — 65270000029 HC RM PRIVATE

## 2021-08-26 PROCEDURE — 87635 SARS-COV-2 COVID-19 AMP PRB: CPT

## 2021-08-26 PROCEDURE — U0003 INFECTIOUS AGENT DETECTION BY NUCLEIC ACID (DNA OR RNA); SEVERE ACUTE RESPIRATORY SYNDROME CORONAVIRUS 2 (SARS-COV-2) (CORONAVIRUS DISEASE [COVID-19]), AMPLIFIED PROBE TECHNIQUE, MAKING USE OF HIGH THROUGHPUT TECHNOLOGIES AS DESCRIBED BY CMS-2020-01-R: HCPCS

## 2021-08-26 PROCEDURE — 74011250636 HC RX REV CODE- 250/636: Performed by: NURSE PRACTITIONER

## 2021-08-26 PROCEDURE — 74011000258 HC RX REV CODE- 258: Performed by: NURSE PRACTITIONER

## 2021-08-26 PROCEDURE — 99232 SBSQ HOSP IP/OBS MODERATE 35: CPT | Performed by: SURGERY

## 2021-08-26 RX ADMIN — SODIUM CHLORIDE, SODIUM LACTATE, POTASSIUM CHLORIDE, AND CALCIUM CHLORIDE 100 ML/HR: 600; 310; 30; 20 INJECTION, SOLUTION INTRAVENOUS at 10:44

## 2021-08-26 RX ADMIN — VALSARTAN 320 MG: 320 TABLET, FILM COATED ORAL at 09:35

## 2021-08-26 RX ADMIN — Medication 10 ML: at 20:39

## 2021-08-26 RX ADMIN — PANTOPRAZOLE SODIUM 40 MG: 40 TABLET, DELAYED RELEASE ORAL at 09:36

## 2021-08-26 RX ADMIN — PIPERACILLIN SODIUM AND TAZOBACTAM SODIUM 3.38 G: 3; .375 INJECTION, POWDER, LYOPHILIZED, FOR SOLUTION INTRAVENOUS at 20:39

## 2021-08-26 RX ADMIN — Medication 10 ML: at 14:06

## 2021-08-26 RX ADMIN — PIPERACILLIN SODIUM AND TAZOBACTAM SODIUM 3.38 G: 3; .375 INJECTION, POWDER, LYOPHILIZED, FOR SOLUTION INTRAVENOUS at 03:55

## 2021-08-26 RX ADMIN — LEVOTHYROXINE SODIUM 137 MCG: 0.11 TABLET ORAL at 05:13

## 2021-08-26 RX ADMIN — AMLODIPINE BESYLATE 10 MG: 10 TABLET ORAL at 09:35

## 2021-08-26 RX ADMIN — Medication 10 ML: at 06:00

## 2021-08-26 RX ADMIN — PIPERACILLIN SODIUM AND TAZOBACTAM SODIUM 3.38 G: 3; .375 INJECTION, POWDER, LYOPHILIZED, FOR SOLUTION INTRAVENOUS at 12:44

## 2021-08-26 NOTE — ROUTINE PROCESS
Bedside and Verbal shift change report to be given to Anabel Jimenez RN (oncoming nurse) by self (offgoing nurse). Report included the following information SBAR, Kardex, Intake/Output, MAR, and Recent Results.

## 2021-08-26 NOTE — PROGRESS NOTES
Patient Vitals for the past 12 hrs:   Temp Pulse Resp BP SpO2   08/26/21 1947 98.9 °F (37.2 °C) 85 16 (!) 143/77 93 %   08/26/21 1600 98.8 °F (37.1 °C) 81 18 (!) 140/77 92 %   08/26/21 1200 98.4 °F (36.9 °C) 79 20 (!) 156/79 94 %   08/26/21 0800 98.8 °F (37.1 °C) 95 22 (!) 149/77 90 %     COVID test per orders due to surgery tomorrow. Patient to be picked up at 1130am.  LR changed to 50cc/hr per order. Consent labeled and placed in chart per order. Pt to be NPO at midnight.     Beside shift report given to Kip Cordoba, on-coming RN

## 2021-08-26 NOTE — ANESTHESIA PREPROCEDURE EVALUATION
Relevant Problems   No relevant active problems       Anesthetic History               Review of Systems / Medical History  Patient summary reviewed and pertinent labs reviewed    Pulmonary                Comments: Chronic laryngitis    Allergic rhinitis   Neuro/Psych         Psychiatric history (Anxiety)     Cardiovascular    Hypertension          Hyperlipidemia    Exercise tolerance: >4 METS: Pt rides a bike frequently     GI/Hepatic/Renal     GERD    Renal disease (Stage 3): CRI and stones  Hiatal hernia    Comments: Acute cholecystitis Endo/Other      Hypothyroidism: well controlled  Arthritis     Other Findings   Comments: Eczema         Physical Exam    Airway  Mallampati: II  TM Distance: 4 - 6 cm  Neck ROM: normal range of motion   Mouth opening: Normal     Cardiovascular  Regular rate and rhythm,  S1 and S2 normal,  no murmur, click, rub, or gallop             Dental  No notable dental hx       Pulmonary  Breath sounds clear to auscultation               Abdominal  GI exam deferred       Other Findings            Anesthetic Plan    ASA: 2  Anesthesia type: general          Induction: Intravenous  Anesthetic plan and risks discussed with: Patient

## 2021-08-26 NOTE — PROGRESS NOTES
H&P/Consult Note/Progress Note/Office Note:   Shady Grant  MRN: [id number]  XHE:[birth year]  Age:76 y.o.    HPI: Shady Grant is a 67 y.o. male who we are asked by Dr. Alla Almaraz to see for concerns for acute cholecystitis. The patient has a PMHx as listed below. He presented with complaints of abdominal pain. The pain is located in the RUQ without radiation. Patient describes the pain as intermittent and sharp. Additional symptoms include nausea and vomiting. Patient denies diarrhea, constipation, fever and chills. An U/S was obtained which showed cholelithiasis with edematous thickened gallbladder. LFTs WNL but tbili elevated at 1.5 on admission now up to 2.1. Patient describes to former \"attacks\" that happened [employer]". 8/26/21 Awake in bed, no complaints. Tolerated low fat diet. Tbili 1.3.      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    Essential hypertension, benign 2/4/2015    GERD (gastroesophageal reflux disease) 2/4/2015    Gout 2/4/2015    Hypercholesterolemia 2/4/2015    Hypertrophy (benign) of prostate 2/4/2015    Without urinary obstruction and other lower urinary tract symptoms (LUTS)    Hypothyroidism (acquired) 2/4/2015    Impaired glucose tolerance 2/4/2015    Vitamin D deficiency 2/4/2015     Past Surgical History:   Procedure Laterality Date    HX LITHOTRIPSY       Current Facility-Administered Medications   Medication Dose Route Frequency    acetaminophen (TYLENOL) tablet 650 mg  650 mg Oral Q4H PRN    valsartan (DIOVAN) tablet 320 mg  320 mg Oral DAILY    pantoprazole (PROTONIX) tablet 40 mg  40 mg Oral ACB    sodium chloride (NS) flush 5-10 mL  5-10 mL IntraVENous Q8H    sodium chloride (NS) flush 5-10 mL  5-10 mL IntraVENous PRN    lactated Ringers infusion  50 mL/hr IntraVENous CONTINUOUS  piperacillin-tazobactam (ZOSYN) 3.375 g in 0.9% sodium chloride (MBP/ADV) 100 mL MBP  3.375 g IntraVENous Q8H    HYDROmorphone (DILAUDID) injection 0.5 mg  0.5 mg IntraVENous Q3H PRN    ondansetron (ZOFRAN) injection 4 mg  4 mg IntraVENous Q4H PRN    amLODIPine (NORVASC) tablet 10 mg  10 mg Oral DAILY    levothyroxine (SYNTHROID) tablet 137 mcg  137 mcg Oral 6am     Plavix [clopidogrel]  Social History     Socioeconomic History    Marital status: SINGLE     Spouse name: Not on file    Number of children: Not on file    Years of education: Not on file    Highest education level: Not on file   Tobacco Use    Smoking status: Former Smoker     Packs/day: 0.25     Years: 30.00     Pack years: 7.50     Types: Cigarettes     Quit date: 2005     Years since quittin.4    Smokeless tobacco: Never Used   Substance and Sexual Activity    Alcohol use: Yes     Alcohol/week: 7.0 standard drinks     Types: 7 Standard drinks or equivalent per week     Comment: one glass wine per night    Drug use: No     Social Determinants of Health     Financial Resource Strain:     Difficulty of Paying Living Expenses:    Food Insecurity:     Worried About Running Out of Food in the Last Year:     Ran Out of Food in the Last Year:    Transportation Needs:     Lack of Transportation (Medical):      Lack of Transportation (Non-Medical):    Physical Activity:     Days of Exercise per Week:     Minutes of Exercise per Session:    Stress:     Feeling of Stress :    Social Connections:     Frequency of Communication with Friends and Family:     Frequency of Social Gatherings with Friends and Family:     Attends Jew Services:     Active Member of Clubs or Organizations:     Attends Club or Organization Meetings:     Marital Status:      Social History     Tobacco Use   Smoking Status Former Smoker    Packs/day: 0.25    Years: 30.00    Pack years: 7.50    Types: Cigarettes    Quit date: 2005    Years since quittin.4   Smokeless Tobacco Never Used     Family History   Problem Relation Age of Onset    Hypertension Mother     No Known Problems Father      ROS: The patient has no difficulty with chest pain or shortness of breath. No fever or chills. Comprehensive review of systems was otherwise unremarkable except as noted above. Physical Exam:   Visit Vitals  BP (!) 149/77   Pulse 95   Temp 98.8 °F (37.1 °C)   Resp 22   Ht 6' 1\" (1.854 m)   Wt 175 lb (79.4 kg)   SpO2 90%   BMI 23.09 kg/m²     Constitutional: Alert, oriented, cooperative patient in no acute distress; appears stated age    Eyes:Sclera are clear. EOMs intact  ENMT: no external lesions gross hearing normal; no obvious neck masses, no ear or lip lesions, nares normal  CV: RRR. Normal perfusion  Resp: No JVD. Breathing is  non-labored; no audible wheezing. GI: soft and non-distended, ttp RUQ, Positive Winkler's  Musculoskeletal: unremarkable with normal function. No embolic signs or cyanosis.    Neuro:  Oriented; moves all 4; no focal deficits  Psychiatric: normal affect and mood, no memory impairment    Recent vitals (if inpt):  Patient Vitals for the past 24 hrs:   BP Temp Pulse Resp SpO2   21 0800 (!) 149/77 98.8 °F (37.1 °C) 95 22 90 %   21 0354 135/70 98.5 °F (36.9 °C) 79 16 92 %   21 0008 133/75 98.2 °F (36.8 °C) 83 18 92 %   21 134/69 98.2 °F (36.8 °C) 80 18 93 %   21 1642 136/75 98.8 °F (37.1 °C) 90 18 91 %   21 1148 135/77 98.9 °F (37.2 °C) 88 18 91 %       Labs:  Recent Labs     21  0419 21  0449 21  0449 21  1620 21  1620   WBC  --   --  13.6*   < > 17.1*   HGB  --   --  14.4   < > 15.7   PLT  --   --  159   < > 183      < > 138   < > 136*   K 4.2   < > 3.9   < > 3.9   *   < > 108*   < > 106   CO2 24   < > 26   < > 24   BUN 20   < > 18   < > 15   CREA 1.33   < > 1.41   < > 1.36   GLU 91   < > 137*   < > 156*   TBILI 1.3*   < > 2.1*   < > 1.5*   ALT 37   < > 35   < > 26   AP 83   < > 77   < > 82   LPSE  --   --   --   --  63*    < > = values in this interval not displayed. Lab Results   Component Value Date/Time    WBC 13.6 (H) 08/25/2021 04:49 AM    HGB 14.4 08/25/2021 04:49 AM    PLATELET 101 79/30/8515 04:49 AM    Sodium 139 08/26/2021 04:19 AM    Potassium 4.2 08/26/2021 04:19 AM    Chloride 109 (H) 08/26/2021 04:19 AM    CO2 24 08/26/2021 04:19 AM    BUN 20 08/26/2021 04:19 AM    Creatinine 1.33 08/26/2021 04:19 AM    Glucose 91 08/26/2021 04:19 AM    Bilirubin, total 1.3 (H) 08/26/2021 04:19 AM    Bilirubin, direct 0.24 03/01/2017 09:23 AM    ALT (SGPT) 37 08/26/2021 04:19 AM    Alk. phosphatase 83 08/26/2021 04:19 AM    Lipase 63 (L) 08/24/2021 04:20 PM       I reviewed recent labs and recent radiologic studies. US Results (most recent):  Results from [place] encounter on 08/24/21    US ABD LTD    Narrative  RIGHT UPPER QUADRANT ULTRASOUND 8/24/2021    HISTORY: ruq pain that comes and goes; ;    TECHNIQUE: Sonographic imaging of the right upper quadrant was performed. COMPARISON: None    FINDINGS:    The pancreatic head is not well visualized because it is partially obscured by  bowel gas. The gallbladder is distended and contains multiple shadowing stones. Edema is  present on the gallbladder wall which is thickened up to 6 mm. The sonographic  Winkler's sign is absent. The common bile duct is 2.7 mm in diameter. There is no biliary ductal dilatation. There is a cyst in the left hepatic lobe  that measures 3.6 cm in diameter. The right kidney is 10 cm in length. There is no hydronephrosis. There are  multiple nonobstructing stones in the right renal collecting system. The distal aorta is 1.8cm in diameter. The IVC is patent. Impression  1. Cholelithiasis with edematous thickened gallbladder wall. Findings are  concerning for acute cholecystitis. 2. Nephrolithiasis.     CT Results (most recent):  No results found for this or any previous visit. I independently reviewed radiology images for studies I described above or studies I have ordered. Admission date (for inpatients): 8/24/2021   * No surgery found *  Procedure(s):  CHOLECYSTECTOMY LAPAROSCOPIC WITH INTRAOP CHOLANGIOGRAM ROOM 908    ASSESSMENT/PLAN:  Problem List  Date Reviewed: 3/31/2021        Codes Class Noted    Acute cholecystitis ICD-10-CM: K81.0  ICD-9-CM: 575.0  8/24/2021        Hyperbilirubinemia ICD-10-CM: E80.6  ICD-9-CM: 782.4  8/24/2021        Idiopathic chronic gout of multiple sites without tophus ICD-10-CM: M1A. 09X0  ICD-9-CM: 274.02  9/20/2018        IGT (impaired glucose tolerance) ICD-10-CM: R73.02  ICD-9-CM: 790.22  9/18/2018    Overview Signed 9/18/2018  8:53 PM by Dennie Coco, DO     Changed DIABETES DX to IGT 9/18/18             H/O esophageal spasm ICD-10-CM: Z87.19  ICD-9-CM: V12.79  9/10/2017        Seasonal allergic rhinitis due to pollen ICD-10-CM: J30.1  ICD-9-CM: 477.0  9/8/2017        Hiatal hernia with GERD ICD-10-CM: K21.9, K44.9  ICD-9-CM: 530.81, 553.3  3/8/2017        Generalized anxiety disorder ICD-10-CM: F41.1  ICD-9-CM: 300.02  2/4/2015        Calculus of kidney ICD-10-CM: N20.0  ICD-9-CM: 592.0  2/4/2015        Stage 3a chronic kidney disease ([facility] [address] ICD-10-CM: N18.31  ICD-9-CM: 585.3  2/4/2015        Eczema ICD-10-CM: L30.9  ICD-9-CM: 692.9  2/4/2015        Gastroesophageal reflux disease without esophagitis ICD-10-CM: K21.9  ICD-9-CM: 530.81  2/4/2015        Hypercholesterolemia ICD-10-CM: E78.00  ICD-9-CM: 272.0  2/4/2015        Benign essential HTN ICD-10-CM: I10  ICD-9-CM: 401.1  2/4/2015        Benign prostatic hyperplasia with nocturia ICD-10-CM: N40.1, R35.1  ICD-9-CM: 600.01, 788.43  2/4/2015        Hypothyroidism (acquired) ICD-10-CM: E03.9  ICD-9-CM: 244.9  2/4/2015        Vitamin D deficiency ICD-10-CM: E55.9  ICD-9-CM: 268.9  2/4/2015            Active Problems:    Acute cholecystitis (8/24/2021) Hyperbilirubinemia (8/24/2021)           NPO at [facility]  IVFs  ABXs  Obtain consent  SCD for prophylaxis  To OR for lap laine with IOC with Dr. Khoa Maciel 8/27/21    Discussed the patient's condition and treatment options with the patient. Discussed risks of surgery in language the patient could understand. The patient voiced understanding of all this and all questions were answered. Alternatives to surgery were discussed also and risks of the alternatives. The patient requested that we proceed with surgery. Informed consent was obtained. Signed:  Yi Lee NP   Late entry: This patient was seen yesterday about 10:00. He was awake and alert. Denies abdominal pain. No F/N/V. Tbili 1.3. Recommend continue Zosyn. Surgery on Friday 8/27/2021 afternoon. I have personally performed a face-to-face diagnostic evaluation and management  service on this patient. I have independently seen the patient. I have independently obtained the above history from the patient/family. I have independently examined the patient with above findings. I have independently reviewed data/labs for this patient and developed the above plan of care.     [place] [id number] [facility]

## 2021-08-26 NOTE — PERIOP NOTES
Spoke with Marin RN on 9th floor regarding COVID testing prior to surgery. Order placed in [address] [facility].

## 2021-08-26 NOTE — PROGRESS NOTES
Problem: Falls - Risk of  Goal: *Absence of Falls  Description: Document Abdulaziz Brice Fall Risk and appropriate interventions in the flowsheet.   Outcome: Progressing Towards Goal  Note: Fall Risk Interventions:            Medication Interventions: Teach patient to arise slowly

## 2021-08-26 NOTE — PROGRESS NOTES
Problem: Falls - Risk of  Goal: *Absence of Falls  Description: Document Amanda Murillo Fall Risk and appropriate interventions in the flowsheet.   Outcome: Progressing Towards Goal  Note: Fall Risk Interventions:            Medication Interventions: Patient to call before getting OOB, Teach patient to arise slowly

## 2021-08-27 ENCOUNTER — HOSPITAL ENCOUNTER (INPATIENT)
Dept: GENERAL RADIOLOGY | Age: 76
Discharge: HOME | DRG: 418 | End: 2021-08-27
Attending: SURGERY
Payer: MEDICARE

## 2021-08-27 ENCOUNTER — ANESTHESIA (OUTPATIENT)
Dept: SURGERY | Age: 76
DRG: 418 | End: 2021-08-27
Payer: MEDICARE

## 2021-08-27 LAB
ALBUMIN SERPL-MCNC: 3.3 G/DL (ref 3.2–4.6)
ALBUMIN/GLOB SERPL: 0.7 {RATIO} (ref 1.2–3.5)
ALP SERPL-CCNC: 114 U/L (ref 50–136)
ALT SERPL-CCNC: 44 U/L (ref 12–65)
ANION GAP SERPL CALC-SCNC: 8 MMOL/L (ref 7–16)
AST SERPL-CCNC: 25 U/L (ref 15–37)
BILIRUB SERPL-MCNC: 1.2 MG/DL (ref 0.2–1.1)
BUN SERPL-MCNC: 15 MG/DL (ref 8–23)
CALCIUM SERPL-MCNC: 9.4 MG/DL (ref 8.3–10.4)
CHLORIDE SERPL-SCNC: 107 MMOL/L (ref 98–107)
CO2 SERPL-SCNC: 22 MMOL/L (ref 21–32)
CREAT SERPL-MCNC: 1.39 MG/DL (ref 0.8–1.5)
GLOBULIN SER CALC-MCNC: 4.8 G/DL (ref 2.3–3.5)
GLUCOSE SERPL-MCNC: 114 MG/DL (ref 65–100)
POTASSIUM SERPL-SCNC: 3.9 MMOL/L (ref 3.5–5.1)
PROT SERPL-MCNC: 8.1 G/DL (ref 6.3–8.2)
SODIUM SERPL-SCNC: 137 MMOL/L (ref 138–145)

## 2021-08-27 PROCEDURE — 74011250637 HC RX REV CODE- 250/637: Performed by: ANESTHESIOLOGY

## 2021-08-27 PROCEDURE — 77030000038 HC TIP SCIS LAPSCP SURI -B: Performed by: SURGERY

## 2021-08-27 PROCEDURE — 77030019908 HC STETH ESOPH SIMS -A: Performed by: ANESTHESIOLOGY

## 2021-08-27 PROCEDURE — 76060000034 HC ANESTHESIA 1.5 TO 2 HR: Performed by: SURGERY

## 2021-08-27 PROCEDURE — 80053 COMPREHEN METABOLIC PANEL: CPT

## 2021-08-27 PROCEDURE — 77030039425 HC BLD LARYNG TRULITE DISP TELE -A: Performed by: ANESTHESIOLOGY

## 2021-08-27 PROCEDURE — 88304 TISSUE EXAM BY PATHOLOGIST: CPT

## 2021-08-27 PROCEDURE — 76010000153 HC OR TIME 1.5 TO 2 HR: Performed by: SURGERY

## 2021-08-27 PROCEDURE — 77030016151 HC PROTCTR LNS DFOG COVD -B: Performed by: SURGERY

## 2021-08-27 PROCEDURE — 36415 COLL VENOUS BLD VENIPUNCTURE: CPT

## 2021-08-27 PROCEDURE — 77030031139 HC SUT VCRL2 J&J -A: Performed by: SURGERY

## 2021-08-27 PROCEDURE — 77030008518 HC TBNG INSUF ENDO STRY -B: Performed by: SURGERY

## 2021-08-27 PROCEDURE — 65270000029 HC RM PRIVATE

## 2021-08-27 PROCEDURE — 74011250636 HC RX REV CODE- 250/636: Performed by: NURSE PRACTITIONER

## 2021-08-27 PROCEDURE — 77030004818 HC CATH CHOLGM TELE -B: Performed by: SURGERY

## 2021-08-27 PROCEDURE — 2709999900 HC NON-CHARGEABLE SUPPLY: Performed by: SURGERY

## 2021-08-27 PROCEDURE — 77030008606 HC TRCR ENDOSC KII AMR -B: Performed by: SURGERY

## 2021-08-27 PROCEDURE — 74011250636 HC RX REV CODE- 250/636: Performed by: ANESTHESIOLOGY

## 2021-08-27 PROCEDURE — 74011250636 HC RX REV CODE- 250/636: Performed by: SURGERY

## 2021-08-27 PROCEDURE — C1894 INTRO/SHEATH, NON-LASER: HCPCS | Performed by: SURGERY

## 2021-08-27 PROCEDURE — 77030040922 HC BLNKT HYPOTHRM STRY -A: Performed by: ANESTHESIOLOGY

## 2021-08-27 PROCEDURE — 74011000258 HC RX REV CODE- 258: Performed by: SURGERY

## 2021-08-27 PROCEDURE — 77030008756 HC TU IRR SUC STRY -B: Performed by: SURGERY

## 2021-08-27 PROCEDURE — 77030020829: Performed by: SURGERY

## 2021-08-27 PROCEDURE — 74011000250 HC RX REV CODE- 250: Performed by: ANESTHESIOLOGY

## 2021-08-27 PROCEDURE — 77030010513 HC APPL CLP LIG J&J -C: Performed by: SURGERY

## 2021-08-27 PROCEDURE — 74011000258 HC RX REV CODE- 258: Performed by: NURSE PRACTITIONER

## 2021-08-27 PROCEDURE — 77030037088 HC TUBE ENDOTRACH ORAL NSL COVD-A: Performed by: ANESTHESIOLOGY

## 2021-08-27 PROCEDURE — 77030012799 HC TRCR GELPRT BLN AMR -B: Performed by: SURGERY

## 2021-08-27 PROCEDURE — 74011000250 HC RX REV CODE- 250: Performed by: SURGERY

## 2021-08-27 PROCEDURE — 77030007955 HC PCH ENDOSC SPEC J&J -B: Performed by: SURGERY

## 2021-08-27 PROCEDURE — 74011000636 HC RX REV CODE- 636: Performed by: SURGERY

## 2021-08-27 PROCEDURE — 74011250637 HC RX REV CODE- 250/637: Performed by: NURSE PRACTITIONER

## 2021-08-27 PROCEDURE — 76210000006 HC OR PH I REC 0.5 TO 1 HR: Performed by: SURGERY

## 2021-08-27 PROCEDURE — 0FT44ZZ RESECTION OF GALLBLADDER, PERCUTANEOUS ENDOSCOPIC APPROACH: ICD-10-PCS | Performed by: SURGERY

## 2021-08-27 PROCEDURE — 47562 LAPAROSCOPIC CHOLECYSTECTOMY: CPT | Performed by: SURGERY

## 2021-08-27 RX ORDER — ROCURONIUM BROMIDE 10 MG/ML
INJECTION, SOLUTION INTRAVENOUS AS NEEDED
Status: DISCONTINUED | OUTPATIENT
Start: 2021-08-27 | End: 2021-08-27 | Stop reason: HOSPADM

## 2021-08-27 RX ORDER — HYDROMORPHONE HYDROCHLORIDE 2 MG/ML
0.5 INJECTION, SOLUTION INTRAMUSCULAR; INTRAVENOUS; SUBCUTANEOUS
Status: DISCONTINUED | OUTPATIENT
Start: 2021-08-27 | End: 2021-08-27

## 2021-08-27 RX ORDER — FENTANYL CITRATE 50 UG/ML
100 INJECTION, SOLUTION INTRAMUSCULAR; INTRAVENOUS ONCE
Status: DISCONTINUED | OUTPATIENT
Start: 2021-08-27 | End: 2021-08-27 | Stop reason: HOSPADM

## 2021-08-27 RX ORDER — MIDAZOLAM HYDROCHLORIDE 1 MG/ML
2 INJECTION, SOLUTION INTRAMUSCULAR; INTRAVENOUS ONCE
Status: DISCONTINUED | OUTPATIENT
Start: 2021-08-27 | End: 2021-08-27 | Stop reason: HOSPADM

## 2021-08-27 RX ORDER — EPHEDRINE SULFATE/0.9% NACL/PF 50 MG/5 ML
SYRINGE (ML) INTRAVENOUS AS NEEDED
Status: DISCONTINUED | OUTPATIENT
Start: 2021-08-27 | End: 2021-08-27 | Stop reason: HOSPADM

## 2021-08-27 RX ORDER — SODIUM CHLORIDE, SODIUM LACTATE, POTASSIUM CHLORIDE, CALCIUM CHLORIDE 600; 310; 30; 20 MG/100ML; MG/100ML; MG/100ML; MG/100ML
100 INJECTION, SOLUTION INTRAVENOUS CONTINUOUS
Status: DISCONTINUED | OUTPATIENT
Start: 2021-08-27 | End: 2021-08-27

## 2021-08-27 RX ORDER — DEXAMETHASONE SODIUM PHOSPHATE 4 MG/ML
INJECTION, SOLUTION INTRA-ARTICULAR; INTRALESIONAL; INTRAMUSCULAR; INTRAVENOUS; SOFT TISSUE AS NEEDED
Status: DISCONTINUED | OUTPATIENT
Start: 2021-08-27 | End: 2021-08-27 | Stop reason: HOSPADM

## 2021-08-27 RX ORDER — ONDANSETRON 2 MG/ML
INJECTION INTRAMUSCULAR; INTRAVENOUS AS NEEDED
Status: DISCONTINUED | OUTPATIENT
Start: 2021-08-27 | End: 2021-08-27 | Stop reason: HOSPADM

## 2021-08-27 RX ORDER — OXYCODONE AND ACETAMINOPHEN 5; 325 MG/1; MG/1
1 TABLET ORAL
Status: DISCONTINUED | OUTPATIENT
Start: 2021-08-27 | End: 2021-08-28 | Stop reason: HOSPADM

## 2021-08-27 RX ORDER — ACETAMINOPHEN 500 MG
1000 TABLET ORAL ONCE
Status: COMPLETED | OUTPATIENT
Start: 2021-08-27 | End: 2021-08-27

## 2021-08-27 RX ORDER — NEOSTIGMINE METHYLSULFATE 1 MG/ML
INJECTION, SOLUTION INTRAVENOUS AS NEEDED
Status: DISCONTINUED | OUTPATIENT
Start: 2021-08-27 | End: 2021-08-27 | Stop reason: HOSPADM

## 2021-08-27 RX ORDER — BUPIVACAINE HYDROCHLORIDE AND EPINEPHRINE 5; 5 MG/ML; UG/ML
INJECTION, SOLUTION EPIDURAL; INTRACAUDAL; PERINEURAL AS NEEDED
Status: DISCONTINUED | OUTPATIENT
Start: 2021-08-27 | End: 2021-08-27 | Stop reason: HOSPADM

## 2021-08-27 RX ORDER — MIDAZOLAM HYDROCHLORIDE 1 MG/ML
2 INJECTION, SOLUTION INTRAMUSCULAR; INTRAVENOUS
Status: DISCONTINUED | OUTPATIENT
Start: 2021-08-27 | End: 2021-08-27 | Stop reason: HOSPADM

## 2021-08-27 RX ORDER — GLYCOPYRROLATE 0.2 MG/ML
INJECTION INTRAMUSCULAR; INTRAVENOUS AS NEEDED
Status: DISCONTINUED | OUTPATIENT
Start: 2021-08-27 | End: 2021-08-27 | Stop reason: HOSPADM

## 2021-08-27 RX ORDER — LIDOCAINE HYDROCHLORIDE 10 MG/ML
0.1 INJECTION INFILTRATION; PERINEURAL AS NEEDED
Status: DISCONTINUED | OUTPATIENT
Start: 2021-08-27 | End: 2021-08-27 | Stop reason: HOSPADM

## 2021-08-27 RX ORDER — LIDOCAINE HYDROCHLORIDE 20 MG/ML
INJECTION, SOLUTION EPIDURAL; INFILTRATION; INTRACAUDAL; PERINEURAL AS NEEDED
Status: DISCONTINUED | OUTPATIENT
Start: 2021-08-27 | End: 2021-08-27 | Stop reason: HOSPADM

## 2021-08-27 RX ORDER — NALOXONE HYDROCHLORIDE 0.4 MG/ML
0.04 INJECTION, SOLUTION INTRAMUSCULAR; INTRAVENOUS; SUBCUTANEOUS
Status: DISCONTINUED | OUTPATIENT
Start: 2021-08-27 | End: 2021-08-27

## 2021-08-27 RX ORDER — SODIUM CHLORIDE, SODIUM LACTATE, POTASSIUM CHLORIDE, CALCIUM CHLORIDE 600; 310; 30; 20 MG/100ML; MG/100ML; MG/100ML; MG/100ML
100 INJECTION, SOLUTION INTRAVENOUS CONTINUOUS
Status: DISCONTINUED | OUTPATIENT
Start: 2021-08-27 | End: 2021-08-27 | Stop reason: HOSPADM

## 2021-08-27 RX ORDER — OXYCODONE HYDROCHLORIDE 5 MG/1
5 TABLET ORAL
Status: COMPLETED | OUTPATIENT
Start: 2021-08-27 | End: 2021-08-27

## 2021-08-27 RX ORDER — PROPOFOL 10 MG/ML
INJECTION, EMULSION INTRAVENOUS AS NEEDED
Status: DISCONTINUED | OUTPATIENT
Start: 2021-08-27 | End: 2021-08-27 | Stop reason: HOSPADM

## 2021-08-27 RX ORDER — FENTANYL CITRATE 50 UG/ML
INJECTION, SOLUTION INTRAMUSCULAR; INTRAVENOUS AS NEEDED
Status: DISCONTINUED | OUTPATIENT
Start: 2021-08-27 | End: 2021-08-27 | Stop reason: HOSPADM

## 2021-08-27 RX ADMIN — PROPOFOL 200 MG: 10 INJECTION, EMULSION INTRAVENOUS at 14:52

## 2021-08-27 RX ADMIN — DEXAMETHASONE SODIUM PHOSPHATE 4 MG: 4 INJECTION, SOLUTION INTRAMUSCULAR; INTRAVENOUS at 15:46

## 2021-08-27 RX ADMIN — ROCURONIUM BROMIDE 50 MG: 10 INJECTION, SOLUTION INTRAVENOUS at 14:52

## 2021-08-27 RX ADMIN — FENTANYL CITRATE 100 MCG: 50 INJECTION INTRAMUSCULAR; INTRAVENOUS at 14:52

## 2021-08-27 RX ADMIN — Medication 10 MG: at 15:18

## 2021-08-27 RX ADMIN — Medication 10 ML: at 18:42

## 2021-08-27 RX ADMIN — AMLODIPINE BESYLATE 10 MG: 10 TABLET ORAL at 09:22

## 2021-08-27 RX ADMIN — Medication 3 MG: at 16:15

## 2021-08-27 RX ADMIN — LIDOCAINE HYDROCHLORIDE 60 MG: 20 INJECTION, SOLUTION EPIDURAL; INFILTRATION; INTRACAUDAL; PERINEURAL at 14:52

## 2021-08-27 RX ADMIN — PIPERACILLIN SODIUM AND TAZOBACTAM SODIUM 3.38 G: 3; .375 INJECTION, POWDER, LYOPHILIZED, FOR SOLUTION INTRAVENOUS at 03:54

## 2021-08-27 RX ADMIN — Medication 10 ML: at 03:54

## 2021-08-27 RX ADMIN — GLYCOPYRROLATE 0.4 MG: 0.2 INJECTION, SOLUTION INTRAMUSCULAR; INTRAVENOUS at 16:15

## 2021-08-27 RX ADMIN — ONDANSETRON 4 MG: 2 INJECTION INTRAMUSCULAR; INTRAVENOUS at 15:46

## 2021-08-27 RX ADMIN — Medication 10 MG: at 15:15

## 2021-08-27 RX ADMIN — LEVOTHYROXINE SODIUM 137 MCG: 0.11 TABLET ORAL at 09:22

## 2021-08-27 RX ADMIN — SODIUM CHLORIDE, SODIUM LACTATE, POTASSIUM CHLORIDE, AND CALCIUM CHLORIDE 100 ML/HR: 600; 310; 30; 20 INJECTION, SOLUTION INTRAVENOUS at 18:42

## 2021-08-27 RX ADMIN — VALSARTAN 320 MG: 320 TABLET, FILM COATED ORAL at 09:22

## 2021-08-27 RX ADMIN — PANTOPRAZOLE SODIUM 40 MG: 40 TABLET, DELAYED RELEASE ORAL at 09:22

## 2021-08-27 RX ADMIN — Medication 10 ML: at 19:46

## 2021-08-27 RX ADMIN — PIPERACILLIN SODIUM AND TAZOBACTAM SODIUM 3.38 G: 3; .375 INJECTION, POWDER, LYOPHILIZED, FOR SOLUTION INTRAVENOUS at 11:55

## 2021-08-27 RX ADMIN — ACETAMINOPHEN 1000 MG: 500 TABLET ORAL at 13:28

## 2021-08-27 RX ADMIN — OXYCODONE 5 MG: 5 TABLET ORAL at 17:04

## 2021-08-27 RX ADMIN — HYDROMORPHONE HYDROCHLORIDE 0.5 MG: 1 INJECTION, SOLUTION INTRAMUSCULAR; INTRAVENOUS; SUBCUTANEOUS at 19:47

## 2021-08-27 RX ADMIN — PIPERACILLIN SODIUM AND TAZOBACTAM SODIUM 3.38 G: 3; .375 INJECTION, POWDER, LYOPHILIZED, FOR SOLUTION INTRAVENOUS at 19:46

## 2021-08-27 RX ADMIN — SODIUM CHLORIDE, SODIUM LACTATE, POTASSIUM CHLORIDE, AND CALCIUM CHLORIDE 100 ML/HR: 600; 310; 30; 20 INJECTION, SOLUTION INTRAVENOUS at 13:24

## 2021-08-27 RX ADMIN — SODIUM CHLORIDE, SODIUM LACTATE, POTASSIUM CHLORIDE, AND CALCIUM CHLORIDE 50 ML/HR: 600; 310; 30; 20 INJECTION, SOLUTION INTRAVENOUS at 12:01

## 2021-08-27 NOTE — PERIOP NOTES
TRANSFER - OUT REPORT:    Verbal report given to [facility] [place] on Marianne Anthony  being transferred to room 908 for routine post - op       Report consisted of patients Situation, Background, Assessment and   Recommendations(SBAR). Information from the following report(s) SBAR, Kardex, OR Summary, Intake/Output and MAR was reviewed with the receiving nurse. Lines:   Peripheral IV 08/24/21 Left Antecubital (Active)   Site Assessment Clean, dry, & intact 08/27/21 1632   Phlebitis Assessment 0 08/27/21 1632   Infiltration Assessment 0 08/27/21 1632   Dressing Status Clean, dry, & intact 08/27/21 1632   Dressing Type Transparent;Tape 08/27/21 1632   Hub Color/Line Status Pink; Infusing 08/27/21 1632   Alcohol Cap Used No 08/27/21 1137       Peripheral IV 08/26/21 Anterior; Left Forearm (Active)   Site Assessment Clean, dry, & intact 08/27/21 1632   Phlebitis Assessment 0 08/27/21 1632   Infiltration Assessment 0 08/27/21 1632   Dressing Status Clean, dry, & intact 08/27/21 1632   Dressing Type Transparent;Tape 08/27/21 1632   Hub Color/Line Status Pink;Flushed;Capped 08/27/21 1632   Alcohol Cap Used No 08/27/21 1137        Opportunity for questions and clarification was provided. Patient transported with:   O2 @ 3 liters    VTE prophylaxis orders have been written for Marianne Cruz.

## 2021-08-27 NOTE — BRIEF OP NOTE
Brief Postoperative Note    Patient: Elizabet Donahue  YOB: 1945  MRN: [id number]    Date of Procedure: 8/27/2021     Pre-Op Diagnosis: Acute cholecystitis [K81.0]    Post-Op Diagnosis: Same as preoperative diagnosis. Procedure(s):  CHOLECYSTECTOMY LAPAROSCOPIC CPT [id number]    Surgeon(s):  Gita Jones DO    Surgical Assistant: None    Anesthesia: General     Estimated Blood Loss (mL): Minimal    Complications: None    Specimens:   ID Type Source Tests Collected by Time Destination   1 : Gallbladder  Preservative Gallbladder  Gita Jones DO 8/27/2021 1519 Pathology        Implants: * No implants in log *    Drains: * No LDAs found *    Findings: Acute cholecystitis. Normal-appearing liver with gallbladder with thickened wall and hydrops requiring needle drainage. Omental attachments. Only a 2 to 3 mm cystic duct and I was unable to cannulate with cholangiocatheter and cholangiogram was aborted. Cholangiogram was aborted for fear of tearing the small duct in half and losing it. Risk outweigh the benefits. Gangrenous posterior wall with large stones retrieved. No drain was placed.     Electronically Signed by Tiff Ross DO on 8/27/2021 at 4:19 PM  [id number]

## 2021-08-27 NOTE — PROGRESS NOTES
Pt admitted on 8/24 with acute cholecystitis. Patient presented with c/o abdominal pain located in the RUQ without radiation. Pt has no d/c needs currently but will continue to monitor. Care Management Interventions  PCP Verified by CM: Yes Jacqui Shafer)  Mode of Transport at Discharge:  Other (see comment)  Transition of Care Consult (CM Consult): Discharge Planning  Discharge Durable Medical Equipment: No  Physical Therapy Consult: No  Occupational Therapy Consult: No  Current Support Network: Lives with Spouse, Family Lives [place], Own Home  Confirm Follow Up Transport: Family  The Plan for Transition of Care is Related to the Following Treatment Goals : Return home and back to his baseline  Discharge Location  Discharge Placement: Home

## 2021-08-27 NOTE — INTERVAL H&P NOTE
Update History & Physical    The Patient's History and Physical of August 25, 2021 was reviewed with the patient and I examined the patient. There was no change. The surgical site was confirmed by the patient and me. Plan:  The risk, benefits, expected outcome, and alternative to the recommended procedure have been discussed with the patient. Patient understands and wants to proceed with the procedure.     Electronically signed by Abbi Gan DO on 8/27/2021 at 2:04 PM

## 2021-08-27 NOTE — PERIOP NOTES
Debrief completed yes    Correct procedure yes    Count completed and verified yes    Specimen collected and verified yes    Wound classification yes    Other n/a

## 2021-08-27 NOTE — ANESTHESIA POSTPROCEDURE EVALUATION
Procedure(s):  CHOLECYSTECTOMY LAPAROSCOPIC  . general    Anesthesia Post Evaluation        Patient location during evaluation: PACU  Patient participation: complete - patient participated  Level of consciousness: awake  Pain management: satisfactory to patient  Airway patency: patent  Anesthetic complications: no  Cardiovascular status: hemodynamically stable  Respiratory status: spontaneous ventilation  Hydration status: euvolemic  Post anesthesia nausea and vomiting:  none      INITIAL Post-op Vital signs:   Vitals Value Taken Time   /66 08/27/21 1659   Temp 36.2 °C (97.2 °F) 08/27/21 1632   Pulse 68 08/27/21 1700   Resp 18 08/27/21 1655   SpO2 96 % 08/27/21 1700   Vitals shown include unvalidated device data.

## 2021-08-28 VITALS
HEIGHT: 73 IN | HEART RATE: 86 BPM | WEIGHT: 175 LBS | DIASTOLIC BLOOD PRESSURE: 82 MMHG | OXYGEN SATURATION: 95 % | SYSTOLIC BLOOD PRESSURE: 133 MMHG | RESPIRATION RATE: 20 BRPM | TEMPERATURE: 98.4 F | BODY MASS INDEX: 23.19 KG/M2

## 2021-08-28 LAB
ALBUMIN SERPL-MCNC: 3.2 G/DL (ref 3.2–4.6)
ALBUMIN/GLOB SERPL: 0.7 {RATIO} (ref 1.2–3.5)
ALP SERPL-CCNC: 108 U/L (ref 50–136)
ALT SERPL-CCNC: 69 U/L (ref 12–65)
ANION GAP SERPL CALC-SCNC: 7 MMOL/L (ref 7–16)
AST SERPL-CCNC: 52 U/L (ref 15–37)
BILIRUB SERPL-MCNC: 0.8 MG/DL (ref 0.2–1.1)
BUN SERPL-MCNC: 18 MG/DL (ref 8–23)
CALCIUM SERPL-MCNC: 9.3 MG/DL (ref 8.3–10.4)
CHLORIDE SERPL-SCNC: 104 MMOL/L (ref 98–107)
CO2 SERPL-SCNC: 24 MMOL/L (ref 21–32)
CREAT SERPL-MCNC: 1.31 MG/DL (ref 0.8–1.5)
GLOBULIN SER CALC-MCNC: 4.7 G/DL (ref 2.3–3.5)
GLUCOSE SERPL-MCNC: 136 MG/DL (ref 65–100)
POTASSIUM SERPL-SCNC: 4.6 MMOL/L (ref 3.5–5.1)
PROT SERPL-MCNC: 7.9 G/DL (ref 6.3–8.2)
SODIUM SERPL-SCNC: 135 MMOL/L (ref 138–145)

## 2021-08-28 PROCEDURE — 74011250636 HC RX REV CODE- 250/636: Performed by: SURGERY

## 2021-08-28 PROCEDURE — 36415 COLL VENOUS BLD VENIPUNCTURE: CPT

## 2021-08-28 PROCEDURE — 74011250637 HC RX REV CODE- 250/637: Performed by: SURGERY

## 2021-08-28 PROCEDURE — 74011000258 HC RX REV CODE- 258: Performed by: SURGERY

## 2021-08-28 PROCEDURE — 80053 COMPREHEN METABOLIC PANEL: CPT

## 2021-08-28 RX ORDER — OXYCODONE AND ACETAMINOPHEN 5; 325 MG/1; MG/1
1 TABLET ORAL
Qty: 20 TABLET | Refills: 0 | Status: SHIPPED
Start: 2021-08-28 | End: 2021-09-02

## 2021-08-28 RX ORDER — ACETAMINOPHEN 500 MG
TABLET ORAL
Qty: 100 TABLET | Refills: 0 | Status: SHIPPED | OUTPATIENT
Start: 2021-08-28 | End: 2021-09-16

## 2021-08-28 RX ADMIN — PIPERACILLIN SODIUM AND TAZOBACTAM SODIUM 3.38 G: 3; .375 INJECTION, POWDER, LYOPHILIZED, FOR SOLUTION INTRAVENOUS at 05:06

## 2021-08-28 RX ADMIN — LEVOTHYROXINE SODIUM 137 MCG: 0.11 TABLET ORAL at 05:06

## 2021-08-28 RX ADMIN — VALSARTAN 320 MG: 320 TABLET, FILM COATED ORAL at 11:19

## 2021-08-28 RX ADMIN — PANTOPRAZOLE SODIUM 40 MG: 40 TABLET, DELAYED RELEASE ORAL at 11:19

## 2021-08-28 RX ADMIN — Medication 10 ML: at 05:06

## 2021-08-28 RX ADMIN — AMLODIPINE BESYLATE 10 MG: 10 TABLET ORAL at 11:19

## 2021-08-28 NOTE — DISCHARGE SUMMARY
Physician Discharge Summary     Patient ID:  Daylin Resendiz  [id number]  44 y.o.  1945    Allergies: Plavix [clopidogrel]    Admit Date: 8/24/2021    Discharge Date: 8/28/2021     HPI: Daylin Resendiz is a 67 y.o. male who we are asked by Dr. Laureen Pool to see for concerns for acute cholecystitis. The patient has a PMHx as listed below. He presented with complaints of abdominal pain. The pain is located in the RUQ without radiation. Patient describes the pain as intermittent and sharp. Additional symptoms include nausea and vomiting. Patient denies diarrhea, constipation, fever and chills. An U/S was obtained which showed cholelithiasis with edematous thickened gallbladder. LFTs WNL but tbili elevated at 1.5 on admission now up to 2.1. Patient describes to former \"attacks\" that happened \"years ago\".      Pt underwent Laparoscopic cholecystectomy with attempted cholangiogram 8/27/21 by Dr. Steve Dominguez.      * Admission Diagnoses: Acute cholecystitis [K81.0]  Hyperbilirubinemia [E80.6]    * Discharge Diagnoses:    Hospital Problems as of 8/28/2021 Date Reviewed: 3/31/2021        Codes Class Noted - Resolved POA    * (Principal) Acute cholecystitis ICD-10-CM: K81.0  ICD-9-CM: 575.0  8/24/2021 - Present         Hyperbilirubinemia ICD-10-CM: E80.6  ICD-9-CM: 782.4  8/24/2021 - Present                Admission Condition: Stable    * Discharge Condition: good and stable    * Procedures: Procedure(s):  [address] Course:   Normal hospital course for this procedure. Consults: None    Significant Diagnostic Studies: labs    * Disposition: Home    Discharge Medications:   Current Discharge Medication List      START taking these medications    Details   oxyCODONE-acetaminophen (PERCOCET) 5-325 mg per tablet Take 1 Tablet by mouth every four (4) hours as needed for Pain for up to 5 days. Max Daily Amount: 6 Tablets.   Qty: 20 Tablet, Refills: 0  Start date: 8/28/2021, End date: 9/2/2021 Associated Diagnoses: Acute cholecystitis      acetaminophen (TYLENOL) 500 mg tablet Take 2 Tablets by mouth every eight (8) hours for 7 days, THEN 2 Tablets every twelve (12) hours for 7 days, THEN 2 Tablets every eight (8) hours for 5 days. Qty: 100 Tablet, Refills: 0  Start date: 8/28/2021, End date: 9/16/2021    Comments: Pharmacist, Please add the following text to the patient instructions, \"Take as needed for pain in addition to any other medications ordered for pain relief. \"         CONTINUE these medications which have NOT CHANGED    Details   levothyroxine (Unithroid) 137 mcg tablet Take 137 mcg by mouth Daily (before breakfast). Qty: 90 Tab, Refills: 3    Associated Diagnoses: Hypothyroidism (acquired)      amLODIPine (NORVASC) 10 mg tablet TAKE 1 TABLET BY MOUTH EVERY DAY  Qty: 90 Tab, Refills: 3    Associated Diagnoses: Benign essential HTN      irbesartan (AVAPRO) 300 mg tablet TAKE 1 TABLET BY MOUTH EVERY DAY  Qty: 90 Tab, Refills: 3    Associated Diagnoses: Benign essential HTN      finasteride (PROSCAR) 5 mg tablet TAKE 1 TABLET BY MOUTH EVERY DAY  Qty: 90 Tab, Refills: 3      omeprazole (PRILOSEC) 20 mg capsule TAKE 1 CAPSULE BY MOUTH EVERY DAY  Qty: 90 Cap, Refills: 3      colchicine (Colcrys) 0.6 mg tablet Take 1 Tab by mouth daily. Take 2 pills on first day, then 1 pill daily thereafter. Indications: acute inflammation of the joints due to gout attack  Qty: 7 Tab, Refills: 1    Associated Diagnoses: Idiopathic chronic gout of multiple sites without tophus      omega-3 fatty acids-vitamin e (FISH OIL) 1,000 mg cap Take 1 Cap by mouth. Cholecalciferol, Vitamin D3, (VITAMIN D3) 1,000 unit cap Take  by mouth. * Follow-up Care/Patient Instructions:   Activity: Activity as tolerated, No heavy lifting  Diet: Soft diet  Wound Care: Keep wound clean and dry    Follow-up Information     Follow up With Specialties Details Why Contact Info    Elliott Lorenzo DO Family Medicine   [address]  [facility] [address]  [phone]          Discharge Instructions/Follow-up Plans:   MD Instructions:     Follow-up with Dr. Wolf Ba or PA in 7-10 days. Call office on Monday to schedule appt time. Keep incisions clean and dry, may remain uncovered. Do not apply lotions, creams or ointments to incisions.     Diet - as tolerated - Soft foods diet. Activity - ambulate - as tolerated - no heavy lifting >10lb. May shower - no tub baths or soaking/submerging.     Rx: Percocet    No driving while taking narcotics. Do not drink alcohol while taking narcotics. Resume other home medications.  Ok to resume Plavix today.     If problems or questions arise, please call our office at [phone].     Greater than 30 minutes were spent discharging the patient            Signed:  Raphael Kumar NP  8/28/2021  11:15 AM

## 2021-08-28 NOTE — DISCHARGE INSTRUCTIONS
Follow-up with Dr. Fang Craig or PA in 7-10 days. Call office on Monday to schedule appt time. Keep incisions clean and dry, may remain uncovered. Do not apply lotions, creams or ointments to incisions.     Diet - as tolerated - Soft foods diet. Activity - ambulate - as tolerated - no heavy lifting >10lb. May shower - no tub baths or soaking/submerging.     Rx: Percocet    No driving while taking narcotics. Do not drink alcohol while taking narcotics. Resume other home medications. Ok to resume Plavix today.     If problems or questions arise, please call our office at [phone].

## 2021-08-28 NOTE — PROGRESS NOTES
Bedside report received from [facility], [address] [facility]. Pt resting in bed, denies complaints. Will monitor.

## 2021-08-28 NOTE — PROGRESS NOTES
Hourly rounds completed throughout this shift. Pt resting in bed, drowsy; pt denies needs at this time. Will continue to monitor and report to oncoming night shift nurse.

## 2021-08-28 NOTE — PROGRESS NOTES
H&P/Consult Note/Progress Note/Office Note:   Yannick Turner  MRN: [id number]  GKB:[birth year]  Age:76 y.o.    HPI: Yannick Turner is a 67 y.o. male who we are asked by Dr. Chandan Lee to see for concerns for acute cholecystitis. The patient has a PMHx as listed below. He presented with complaints of abdominal pain. The pain is located in the RUQ without radiation. Patient describes the pain as intermittent and sharp. Additional symptoms include nausea and vomiting. Patient denies diarrhea, constipation, fever and chills. An U/S was obtained which showed cholelithiasis with edematous thickened gallbladder. LFTs WNL but tbili elevated at 1.5 on admission now up to 2.1. Patient describes to former \"attacks\" that happened [employer]". 8/26/21 Awake in bed, no complaints. Tolerated low fat diet. Tbili 1.3.     8/28/21 POD1: Awake in bed. Tolerating Regular diet. No nausea or vomiting. Pain controlled. TBili down to 0.8. AF, NAD.      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    Essential hypertension, benign 2/4/2015    GERD (gastroesophageal reflux disease) 2/4/2015    Gout 2/4/2015    Hypercholesterolemia 2/4/2015    Hypertrophy (benign) of prostate 2/4/2015    Without urinary obstruction and other lower urinary tract symptoms (LUTS)    Hypothyroidism (acquired) 2/4/2015    Impaired glucose tolerance 2/4/2015    Vitamin D deficiency 2/4/2015     Past Surgical History:   Procedure Laterality Date    HX LITHOTRIPSY       Current Facility-Administered Medications   Medication Dose Route Frequency    oxyCODONE-acetaminophen (PERCOCET) 5-325 mg per tablet 1 Tablet  1 Tablet Oral Q4H PRN    acetaminophen (TYLENOL) tablet 650 mg  650 mg Oral Q4H PRN    valsartan (DIOVAN) tablet 320 mg  320 mg Oral DAILY    pantoprazole (PROTONIX) tablet 40 mg  40 mg Oral ACB    sodium chloride (NS) flush 5-10 mL  5-10 mL IntraVENous Q8H    sodium chloride (NS) flush 5-10 mL  5-10 mL IntraVENous PRN    lactated Ringers infusion  50 mL/hr IntraVENous CONTINUOUS    piperacillin-tazobactam (ZOSYN) 3.375 g in 0.9% sodium chloride (MBP/ADV) 100 mL MBP  3.375 g IntraVENous Q8H    HYDROmorphone (DILAUDID) injection 0.5 mg  0.5 mg IntraVENous Q3H PRN    ondansetron (ZOFRAN) injection 4 mg  4 mg IntraVENous Q4H PRN    amLODIPine (NORVASC) tablet 10 mg  10 mg Oral DAILY    levothyroxine (SYNTHROID) tablet 137 mcg  137 mcg Oral 6am     Plavix [clopidogrel]  Social History     Socioeconomic History    Marital status: SINGLE     Spouse name: Not on file    Number of children: Not on file    Years of education: Not on file    Highest education level: Not on file   Tobacco Use    Smoking status: Former Smoker     Packs/day: 0.25     Years: 30.00     Pack years: 7.50     Types: Cigarettes     Quit date: 2005     Years since quittin.4    Smokeless tobacco: Never Used   Substance and Sexual Activity    Alcohol use: Yes     Alcohol/week: 7.0 standard drinks     Types: 7 Standard drinks or equivalent per week     Comment: one glass wine per night    Drug use: No     Social Determinants of Health     Financial Resource Strain:     Difficulty of Paying Living Expenses:    Food Insecurity:     Worried About Running Out of Food in the Last Year:     Ran Out of Food in the Last Year:    Transportation Needs:     Lack of Transportation (Medical):      Lack of Transportation (Non-Medical):    Physical Activity:     Days of Exercise per Week:     Minutes of Exercise per Session:    Stress:     Feeling of Stress :    Social Connections:     Frequency of Communication with Friends and Family:     Frequency of Social Gatherings with Friends and Family:     Attends Hindu Services:     Active Member of Clubs or Organizations:     Attends Club or Organization Meetings:     Marital Status:      Social History     Tobacco Use   Smoking Status Former Smoker    Packs/day: 0.25    Years: 30.00    Pack years: 7.50    Types: Cigarettes    Quit date: 2005    Years since quittin.4   Smokeless Tobacco Never Used     Family History   Problem Relation Age of Onset    Hypertension Mother     No Known Problems Father      ROS: The patient has no difficulty with chest pain or shortness of breath. No fever or chills. Comprehensive review of systems was otherwise unremarkable except as noted above. Physical Exam:   Visit Vitals  /82 (BP 1 Location: Right arm)   Pulse 86   Temp 98.4 °F (36.9 °C)   Resp 20   Ht 6' 1\" (1.854 m)   Wt 175 lb (79.4 kg)   SpO2 95%   BMI 23.09 kg/m²     Constitutional: Alert, oriented, cooperative patient in no acute distress; appears stated age    Eyes: Sclera are clear. EOMs intact  ENMT: no external lesions gross hearing normal; no obvious neck masses, no ear or lip lesions, nares normal  CV: RRR. Normal perfusion  Resp: No JVD. Breathing is  non-labored; no audible wheezing. GI: soft and non-distended, appropriately ttp, post op incision c/d/i  Musculoskeletal: unremarkable with normal function. No embolic signs or cyanosis.    Neuro:  Oriented; moves all 4; no focal deficits  Psychiatric: normal affect and mood, no memory impairment    Recent vitals (if inpt):  Patient Vitals for the past 24 hrs:   BP Temp Pulse Resp SpO2   21 0717 133/82 98.4 °F (36.9 °C) 86 20    21 0340 (!) 140/77 98 °F (36.7 °C) 76 20    21 2330 128/78 98 °F (36.7 °C) 78 20    21 2002 133/76 97.9 °F (36.6 °C) 84 18 95 %   21 1822   80  94 %   21 1810 (!) 146/78  77 20 95 %   21 1755 (!) 140/69  75 20 94 %   21 1740 (!) 143/79  71 14 96 %   21 1724 136/74  69 18 96 %   21 1704 132/64 97.2 °F (36.2 °C) 68 16 96 %   21 1659 136/66  67 14 95 %   21 1655 122/65  65 18 94 %   08/27/21 1650 119/62  64 20 95 %   08/27/21 1645 126/68  (!) 58 16 96 %   08/27/21 1640 119/65  61 14    08/27/21 1635 (!) 117/58  61 18 95 %   08/27/21 1632 120/62 97.2 °F (36.2 °C) 65 14 94 %   08/27/21 1630 120/62  74 16 94 %   08/27/21 1628   62  91 %   08/27/21 1310 (!) 152/74 98.8 °F (37.1 °C) 92 16 95 %   08/27/21 1132 (!) 146/76 98 °F (36.7 °C) 79 16 91 %       Labs:  Recent Labs     08/28/21  0436   *   K 4.6      CO2 24   BUN 18   CREA 1.31   *   TBILI 0.8   ALT 69*          Lab Results   Component Value Date/Time    WBC 13.6 (H) 08/25/2021 04:49 AM    HGB 14.4 08/25/2021 04:49 AM    PLATELET 872 15/21/6142 04:49 AM    Sodium 135 (L) 08/28/2021 04:36 AM    Potassium 4.6 08/28/2021 04:36 AM    Chloride 104 08/28/2021 04:36 AM    CO2 24 08/28/2021 04:36 AM    BUN 18 08/28/2021 04:36 AM    Creatinine 1.31 08/28/2021 04:36 AM    Glucose 136 (H) 08/28/2021 04:36 AM    Bilirubin, total 0.8 08/28/2021 04:36 AM    Bilirubin, direct 0.24 03/01/2017 09:23 AM    ALT (SGPT) 69 (H) 08/28/2021 04:36 AM    Alk. phosphatase 108 08/28/2021 04:36 AM    Lipase 63 (L) 08/24/2021 04:20 PM       I reviewed recent labs and recent radiologic studies. US Results (most recent):  Results from [place] encounter on 08/24/21    US ABD LTD    Narrative  RIGHT UPPER QUADRANT ULTRASOUND 8/24/2021    HISTORY: ruq pain that comes and goes; ;    TECHNIQUE: Sonographic imaging of the right upper quadrant was performed. COMPARISON: None    FINDINGS:    The pancreatic head is not well visualized because it is partially obscured by  bowel gas. The gallbladder is distended and contains multiple shadowing stones. Edema is  present on the gallbladder wall which is thickened up to 6 mm. The sonographic  Winkler's sign is absent. The common bile duct is 2.7 mm in diameter. There is no biliary ductal dilatation.  There is a cyst in the left hepatic lobe  that measures 3.6 cm in diameter. The right kidney is 10 cm in length. There is no hydronephrosis. There are  multiple nonobstructing stones in the right renal collecting system. The distal aorta is 1.8cm in diameter. The IVC is patent. Impression  1. Cholelithiasis with edematous thickened gallbladder wall. Findings are  concerning for acute cholecystitis. 2. Nephrolithiasis. CT Results (most recent):  No results found for this or any previous visit. I independently reviewed radiology images for studies I described above or studies I have ordered. Admission date (for inpatients): 8/24/2021   * No surgery found *  Procedure(s):  CHOLECYSTECTOMY LAPAROSCOPIC      ASSESSMENT/PLAN:  Problem List  Date Reviewed: 3/31/2021        Codes Class Noted    * (Principal) Acute cholecystitis ICD-10-CM: K81.0  ICD-9-CM: 575.0  8/24/2021        Hyperbilirubinemia ICD-10-CM: E80.6  ICD-9-CM: 782.4  8/24/2021        Idiopathic chronic gout of multiple sites without tophus ICD-10-CM: M1A. 22T4  ICD-9-CM: 274.02  9/20/2018        IGT (impaired glucose tolerance) ICD-10-CM: R73.02  ICD-9-CM: 790.22  9/18/2018    Overview Signed 9/18/2018  8:53 PM by Quin Hughes DO     Changed DIABETES DX to IGT 9/18/18             H/O esophageal spasm ICD-10-CM: Z87.19  ICD-9-CM: V12.79  9/10/2017        Seasonal allergic rhinitis due to pollen ICD-10-CM: J30.1  ICD-9-CM: 477.0  9/8/2017        Hiatal hernia with GERD ICD-10-CM: K21.9, K44.9  ICD-9-CM: 530.81, 553.3  3/8/2017        Generalized anxiety disorder ICD-10-CM: F41.1  ICD-9-CM: 300.02  2/4/2015        Calculus of kidney ICD-10-CM: N20.0  ICD-9-CM: 592.0  2/4/2015        Stage 3a chronic kidney disease ([facility] [address] ICD-10-CM: N18.31  ICD-9-CM: 585.3  2/4/2015        Eczema ICD-10-CM: L30.9  ICD-9-CM: 692.9  2/4/2015        Gastroesophageal reflux disease without esophagitis ICD-10-CM: K21.9  ICD-9-CM: 530.81  2/4/2015        Hypercholesterolemia ICD-10-CM: E78.00  ICD-9-CM: 272.0  2/4/2015 Benign essential HTN ICD-10-CM: I10  ICD-9-CM: 401.1  2/4/2015        Benign prostatic hyperplasia with nocturia ICD-10-CM: N40.1, R35.1  ICD-9-CM: 600.01, 788.43  2/4/2015        Hypothyroidism (acquired) ICD-10-CM: E03.9  ICD-9-CM: 244.9  2/4/2015        Vitamin D deficiency ICD-10-CM: E55.9  ICD-9-CM: 268.9  2/4/2015            Principal Problem:    Acute cholecystitis (8/24/2021)    Active Problems:    Hyperbilirubinemia (8/24/2021)           Regular diet  Pain control  DC home today  Ok to resume Plavix today  F/u in office with Dr. Vera Ryan or P.A. in 10 days. Call office on Monday to schedule appt time.          Signed:  Monisha Barajas NP

## 2021-08-30 NOTE — ADDENDUM NOTE
Addendum  created 08/30/21 1446 by Cole Denis CRNA    Flowsheet accepted, Intraprocedure Flowsheets edited

## 2021-09-08 PROBLEM — Z90.49 S/P LAPAROSCOPIC CHOLECYSTECTOMY: Status: ACTIVE | Noted: 2021-09-08

## 2021-09-09 PROBLEM — E80.6 HYPERBILIRUBINEMIA: Status: RESOLVED | Noted: 2021-08-24 | Resolved: 2021-09-09

## 2021-09-09 PROBLEM — Z28.21 COVID-19 VACCINATION REFUSED: Status: RESOLVED | Noted: 2021-09-09 | Resolved: 2021-09-09

## 2021-09-09 PROBLEM — Z28.21 COVID-19 VACCINATION REFUSED: Status: ACTIVE | Noted: 2021-09-09

## 2021-09-09 PROBLEM — K81.0 ACUTE CHOLECYSTITIS: Status: RESOLVED | Noted: 2021-08-24 | Resolved: 2021-09-09

## 2021-09-09 PROBLEM — Z90.49 S/P LAPAROSCOPIC CHOLECYSTECTOMY: Status: ACTIVE | Noted: 2021-08-27

## 2021-09-09 PROBLEM — K80.13 CALCULUS OF GALLBLADDER WITH ACUTE ON CHRONIC CHOLECYSTITIS WITH OBSTRUCTION: Status: ACTIVE | Noted: 2021-09-09

## 2021-09-09 PROBLEM — K80.13 CALCULUS OF GALLBLADDER WITH ACUTE ON CHRONIC CHOLECYSTITIS WITH OBSTRUCTION: Status: RESOLVED | Noted: 2021-09-09 | Resolved: 2021-09-09

## 2022-03-18 PROBLEM — R73.02 IGT (IMPAIRED GLUCOSE TOLERANCE): Status: ACTIVE | Noted: 2018-09-18

## 2022-03-18 PROBLEM — M1A.09X0 IDIOPATHIC CHRONIC GOUT OF MULTIPLE SITES WITHOUT TOPHUS: Status: ACTIVE | Noted: 2018-09-20

## 2022-03-19 PROBLEM — J30.1 SEASONAL ALLERGIC RHINITIS DUE TO POLLEN: Status: ACTIVE | Noted: 2017-09-08

## 2022-03-19 PROBLEM — K44.9 HIATAL HERNIA WITH GERD: Status: ACTIVE | Noted: 2017-03-08

## 2022-03-19 PROBLEM — Z87.19 H/O ESOPHAGEAL SPASM: Status: ACTIVE | Noted: 2017-09-10

## 2022-03-19 PROBLEM — K21.9 HIATAL HERNIA WITH GERD: Status: ACTIVE | Noted: 2017-03-08

## 2022-03-19 PROBLEM — Z90.49 S/P LAPAROSCOPIC CHOLECYSTECTOMY: Status: ACTIVE | Noted: 2021-08-27

## 2022-06-02 DIAGNOSIS — I10 ESSENTIAL (PRIMARY) HYPERTENSION: ICD-10-CM

## 2022-06-02 DIAGNOSIS — I10 BENIGN ESSENTIAL HTN: Primary | ICD-10-CM

## 2022-06-02 RX ORDER — AMLODIPINE BESYLATE 10 MG/1
TABLET ORAL
Qty: 30 TABLET | OUTPATIENT
Start: 2022-06-02

## 2022-06-02 RX ORDER — IRBESARTAN 300 MG/1
TABLET ORAL
Qty: 30 TABLET | OUTPATIENT
Start: 2022-06-02

## 2022-06-02 RX ORDER — IRBESARTAN 300 MG/1
300 TABLET ORAL DAILY
Qty: 30 TABLET | Refills: 0 | Status: SHIPPED | OUTPATIENT
Start: 2022-06-02 | End: 2022-06-28 | Stop reason: SDUPTHER

## 2022-06-02 RX ORDER — AMLODIPINE BESYLATE 10 MG/1
10 TABLET ORAL DAILY
Qty: 30 TABLET | Refills: 0 | Status: SHIPPED | OUTPATIENT
Start: 2022-06-02 | End: 2022-06-28 | Stop reason: SDUPTHER

## 2022-06-20 DIAGNOSIS — E03.9 HYPOTHYROIDISM, UNSPECIFIED: ICD-10-CM

## 2022-06-20 RX ORDER — LEVOTHYROXINE SODIUM 137 UG/1
TABLET ORAL
Qty: 30 TABLET | Refills: 0 | Status: SHIPPED | OUTPATIENT
Start: 2022-06-20 | End: 2022-07-18 | Stop reason: SDUPTHER

## 2022-06-25 DIAGNOSIS — I10 BENIGN ESSENTIAL HTN: ICD-10-CM

## 2022-06-27 RX ORDER — AMLODIPINE BESYLATE 10 MG/1
TABLET ORAL
Qty: 30 TABLET | Refills: 0 | OUTPATIENT
Start: 2022-06-27

## 2022-06-28 ENCOUNTER — TELEPHONE (OUTPATIENT)
Dept: FAMILY MEDICINE CLINIC | Facility: CLINIC | Age: 77
End: 2022-06-28

## 2022-06-28 DIAGNOSIS — I10 BENIGN ESSENTIAL HTN: ICD-10-CM

## 2022-06-28 NOTE — TELEPHONE ENCOUNTER
Patient called and would like to know if he can get a refill on his irbesartan (AVAPRO) 300 MG tablet and amLODIPine (NORVASC) 10 MG tablet. Please Advise.        [facility] [place]

## 2022-06-29 RX ORDER — AMLODIPINE BESYLATE 10 MG/1
10 TABLET ORAL DAILY
Qty: 90 TABLET | Refills: 3 | Status: SHIPPED | OUTPATIENT
Start: 2022-06-29

## 2022-06-29 RX ORDER — IRBESARTAN 300 MG/1
300 TABLET ORAL DAILY
Qty: 90 TABLET | Refills: 3 | Status: SHIPPED | OUTPATIENT
Start: 2022-06-29

## 2022-07-12 ENCOUNTER — OFFICE VISIT (OUTPATIENT)
Dept: FAMILY MEDICINE CLINIC | Facility: CLINIC | Age: 77
End: 2022-07-12
Payer: COMMERCIAL

## 2022-07-12 VITALS
HEART RATE: 77 BPM | WEIGHT: 174 LBS | DIASTOLIC BLOOD PRESSURE: 70 MMHG | BODY MASS INDEX: 23.06 KG/M2 | OXYGEN SATURATION: 98 % | SYSTOLIC BLOOD PRESSURE: 136 MMHG | HEIGHT: 73 IN

## 2022-07-12 DIAGNOSIS — N40.1 BENIGN PROSTATIC HYPERPLASIA WITH NOCTURIA: ICD-10-CM

## 2022-07-12 DIAGNOSIS — Z79.899 HIGH RISK MEDICATION USE: ICD-10-CM

## 2022-07-12 DIAGNOSIS — R74.8 ELEVATED LIVER ENZYMES: ICD-10-CM

## 2022-07-12 DIAGNOSIS — R73.02 IGT (IMPAIRED GLUCOSE TOLERANCE): ICD-10-CM

## 2022-07-12 DIAGNOSIS — I10 BENIGN ESSENTIAL HTN: Primary | ICD-10-CM

## 2022-07-12 DIAGNOSIS — N18.31 STAGE 3A CHRONIC KIDNEY DISEASE (HCC): ICD-10-CM

## 2022-07-12 DIAGNOSIS — R35.1 BENIGN PROSTATIC HYPERPLASIA WITH NOCTURIA: ICD-10-CM

## 2022-07-12 DIAGNOSIS — E03.9 HYPOTHYROIDISM (ACQUIRED): ICD-10-CM

## 2022-07-12 DIAGNOSIS — K21.9 GASTROESOPHAGEAL REFLUX DISEASE WITHOUT ESOPHAGITIS: ICD-10-CM

## 2022-07-12 LAB
ANION GAP SERPL CALC-SCNC: 9 MMOL/L (ref 7–16)
BUN SERPL-MCNC: 24 MG/DL (ref 8–23)
CALCIUM SERPL-MCNC: 9.5 MG/DL (ref 8.3–10.4)
CHLORIDE SERPL-SCNC: 109 MMOL/L (ref 98–107)
CO2 SERPL-SCNC: 22 MMOL/L (ref 21–32)
CREAT SERPL-MCNC: 1.5 MG/DL (ref 0.8–1.5)
GLUCOSE SERPL-MCNC: 102 MG/DL (ref 65–100)
POTASSIUM SERPL-SCNC: 4.5 MMOL/L (ref 3.5–5.1)
SODIUM SERPL-SCNC: 140 MMOL/L (ref 136–145)

## 2022-07-12 PROCEDURE — 99214 OFFICE O/P EST MOD 30 MIN: CPT | Performed by: FAMILY MEDICINE

## 2022-07-12 PROCEDURE — 1123F ACP DISCUSS/DSCN MKR DOCD: CPT | Performed by: FAMILY MEDICINE

## 2022-07-12 RX ORDER — FINASTERIDE 5 MG/1
5 TABLET, FILM COATED ORAL DAILY
Qty: 90 TABLET | Refills: 3 | Status: SHIPPED | OUTPATIENT
Start: 2022-07-12

## 2022-07-12 ASSESSMENT — PATIENT HEALTH QUESTIONNAIRE - PHQ9
SUM OF ALL RESPONSES TO PHQ QUESTIONS 1-9: 0
SUM OF ALL RESPONSES TO PHQ QUESTIONS 1-9: 0
2. FEELING DOWN, DEPRESSED OR HOPELESS: 0
SUM OF ALL RESPONSES TO PHQ9 QUESTIONS 1 & 2: 0
1. LITTLE INTEREST OR PLEASURE IN DOING THINGS: 0
SUM OF ALL RESPONSES TO PHQ QUESTIONS 1-9: 0
SUM OF ALL RESPONSES TO PHQ QUESTIONS 1-9: 0

## 2022-07-12 ASSESSMENT — ENCOUNTER SYMPTOMS
COUGH: 0
BACK PAIN: 0
VOMITING: 0
NAUSEA: 0
EYES NEGATIVE: 1
WHEEZING: 0
CONSTIPATION: 0
CHEST TIGHTNESS: 0
SHORTNESS OF BREATH: 0
ABDOMINAL PAIN: 0
ALLERGIC/IMMUNOLOGIC NEGATIVE: 1
DIARRHEA: 0

## 2022-07-12 NOTE — PROGRESS NOTES
Primitivo Sanchez DO                Diplomate of the [employer] of [facility] [place]         [phone]    Cely Luo is a 65 y.o. male who was seen on 7/12/2022 for   Chief Complaint   Patient presents with    Hypertension       Assessment & Plan     Diagnosis Orders   1. Benign essential HTN  Basic Metabolic Panel    Comprehensive Metabolic Panel    Well-controlled. Continue current medication      2. Stage 3a chronic kidney disease (HCC)  Basic Metabolic Panel    Comprehensive Metabolic Panel    Hydrate well. Avoid NSAIDs. Recheck renal function today      3. Hypothyroidism (acquired)  TSH    TSH    TSH    levothyroxine (UNITHROID) 137 MCG tablet    Recheck TSH today      4. IGT (impaired glucose tolerance)  Hemoglobin A1C    Hemoglobin O8L    Basic Metabolic Panel    Comprehensive Metabolic Panel    Hemoglobin A1C    Recheck A1c today      5. Gastroesophageal reflux disease without esophagitis      Continue omeprazole. 6. Benign prostatic hyperplasia with nocturia  finasteride (PROSCAR) 5 MG tablet    Refill finasteride. 7. Elevated liver enzymes  Comprehensive Metabolic Panel    Hepatic Function Panel    Recheck liver enzymes today. 8. High risk medication use  Comprehensive Metabolic Panel          No problem-specific Assessment & Plan notes found for this encounter. Follow-up and Dispositions    Return in about 1 year (around 7/13/2023) for ROUTINE NIMA, LABS TODAY.          RECENT LABS/TESTS TO REVIEW and DISCUSS    Results for orders placed or performed in visit on 07/12/22   Hemoglobin A1C   Result Value Ref Range    Hemoglobin A1C 5.6 4.8 - 5.6 %    eAG 114 mg/dL   TSH   Result Value Ref Range    TSH, 3RD GENERATION 1.400 0.358 - 3.740 uIU/mL   Basic Metabolic Panel   Result Value Ref Range    Sodium 140 136 - 145 mmol/L    Potassium 4.5 3.5 - 5.1 mmol/L    Chloride 109 (H) 98 - 107 mmol/L reviewed and are negative. Objective    /70 (Site: Left Upper Arm, Position: Sitting, Cuff Size: Medium Adult)   Pulse 77   Ht 6' 1\" (1.854 m)   Wt 174 lb (78.9 kg)   SpO2 98%   BMI 22.96 kg/m²     Physical Exam  Vitals reviewed. Constitutional:       General: He is not in acute distress. Appearance: Normal appearance. HENT:      Head: Normocephalic and atraumatic. Right Ear: Tympanic membrane, ear canal and external ear normal. There is impacted cerumen. Left Ear: Tympanic membrane, ear canal and external ear normal. There is impacted cerumen. Ears:      Comments: External ear canal(s) obstructed by cerumen. Successfully debrided with cotton swab, and/or ear loop, and/or alligator forceps, and/or elephant ear device. Tolerated well. No incidental bleeding of canal.     Mouth/Throat:      Mouth: Mucous membranes are moist.   Eyes:      Extraocular Movements: Extraocular movements intact. Conjunctiva/sclera: Conjunctivae normal.      Pupils: Pupils are equal, round, and reactive to light. Neck:      Vascular: No carotid bruit. Cardiovascular:      Rate and Rhythm: Normal rate and regular rhythm. Heart sounds: Normal heart sounds. Pulmonary:      Effort: Pulmonary effort is normal.      Breath sounds: Normal breath sounds. Abdominal:      General: Bowel sounds are normal.      Palpations: Abdomen is soft. Musculoskeletal:      Cervical back: Neck supple. No rigidity or tenderness. Lymphadenopathy:      Cervical: No cervical adenopathy. Skin:     General: Skin is warm and dry. Neurological:      General: No focal deficit present. Mental Status: He is alert and oriented to person, place, and time. Psychiatric:         Mood and Affect: Mood normal.         Behavior: Behavior normal.         Thought Content:  Thought content normal.         Judgment: Judgment normal.       On this date 07/12/2022 I have spent 34 minutes reviewing previous notes, lab/imaging results and face to face with the patient discussing the diagnoses and importance of compliance with the treatment plan, as well as documenting on the day of the visit.         Debbie Fox DO  [facility]

## 2022-07-13 LAB
ALBUMIN SERPL-MCNC: 4.4 G/DL (ref 3.2–4.6)
ALBUMIN/GLOB SERPL: 1.3 {RATIO} (ref 1.2–3.5)
ALP SERPL-CCNC: 84 U/L (ref 50–136)
ALT SERPL-CCNC: 27 U/L (ref 12–65)
AST SERPL-CCNC: 15 U/L (ref 15–37)
BILIRUB DIRECT SERPL-MCNC: 0.2 MG/DL
BILIRUB SERPL-MCNC: 0.8 MG/DL (ref 0.2–1.1)
EST. AVERAGE GLUCOSE BLD GHB EST-MCNC: 114 MG/DL
GLOBULIN SER CALC-MCNC: 3.4 G/DL (ref 2.3–3.5)
HBA1C MFR BLD: 5.6 % (ref 4.8–5.6)
PROT SERPL-MCNC: 7.8 G/DL (ref 6.3–8.2)
TSH, 3RD GENERATION: 1.4 UIU/ML (ref 0.36–3.74)

## 2022-07-15 DIAGNOSIS — E03.9 HYPOTHYROIDISM, UNSPECIFIED: ICD-10-CM

## 2022-07-18 RX ORDER — LEVOTHYROXINE SODIUM 137 UG/1
137 TABLET ORAL
Qty: 90 TABLET | Refills: 3 | Status: SHIPPED | OUTPATIENT
Start: 2022-07-18

## 2022-07-18 RX ORDER — LEVOTHYROXINE SODIUM 137 UG/1
TABLET ORAL
Qty: 90 TABLET | Refills: 3 | OUTPATIENT
Start: 2022-07-18

## 2022-10-10 DIAGNOSIS — K21.9 GASTROESOPHAGEAL REFLUX DISEASE WITHOUT ESOPHAGITIS: Primary | ICD-10-CM

## 2022-10-10 RX ORDER — OMEPRAZOLE 20 MG/1
CAPSULE, DELAYED RELEASE ORAL
Qty: 90 CAPSULE | Refills: 3 | Status: SHIPPED | OUTPATIENT
Start: 2022-10-10

## 2023-05-13 DIAGNOSIS — E03.9 HYPOTHYROIDISM (ACQUIRED): ICD-10-CM

## 2023-05-15 RX ORDER — LEVOTHYROXINE SODIUM 137 UG/1
TABLET ORAL
Qty: 90 TABLET | Refills: 3 | OUTPATIENT
Start: 2023-05-15

## 2023-06-24 DIAGNOSIS — I10 BENIGN ESSENTIAL HTN: ICD-10-CM

## 2023-06-26 RX ORDER — AMLODIPINE BESYLATE 10 MG/1
TABLET ORAL
Qty: 90 TABLET | Refills: 3 | Status: SHIPPED | OUTPATIENT
Start: 2023-06-26

## 2023-06-26 RX ORDER — IRBESARTAN 300 MG/1
TABLET ORAL
Qty: 90 TABLET | Refills: 3 | Status: SHIPPED | OUTPATIENT
Start: 2023-06-26

## 2023-07-09 DIAGNOSIS — E03.9 HYPOTHYROIDISM (ACQUIRED): ICD-10-CM

## 2023-07-10 ENCOUNTER — NURSE ONLY (OUTPATIENT)
Dept: FAMILY MEDICINE CLINIC | Facility: CLINIC | Age: 78
End: 2023-07-10

## 2023-07-10 DIAGNOSIS — Z79.899 HIGH RISK MEDICATION USE: ICD-10-CM

## 2023-07-10 DIAGNOSIS — E03.9 HYPOTHYROIDISM (ACQUIRED): ICD-10-CM

## 2023-07-10 DIAGNOSIS — N18.31 STAGE 3A CHRONIC KIDNEY DISEASE (HCC): ICD-10-CM

## 2023-07-10 DIAGNOSIS — I10 BENIGN ESSENTIAL HTN: ICD-10-CM

## 2023-07-10 DIAGNOSIS — R74.8 ELEVATED LIVER ENZYMES: ICD-10-CM

## 2023-07-10 DIAGNOSIS — R73.02 IGT (IMPAIRED GLUCOSE TOLERANCE): ICD-10-CM

## 2023-07-10 RX ORDER — LEVOTHYROXINE SODIUM 137 UG/1
TABLET ORAL
Qty: 90 TABLET | Refills: 3 | OUTPATIENT
Start: 2023-07-10

## 2023-07-11 LAB
ALBUMIN SERPL-MCNC: 4.2 G/DL (ref 3.2–4.6)
ALBUMIN/GLOB SERPL: 1.3 (ref 0.4–1.6)
ALP SERPL-CCNC: 76 U/L (ref 50–136)
ALT SERPL-CCNC: 31 U/L (ref 12–65)
ANION GAP SERPL CALC-SCNC: 6 MMOL/L (ref 2–11)
AST SERPL-CCNC: 15 U/L (ref 15–37)
BILIRUB SERPL-MCNC: 1 MG/DL (ref 0.2–1.1)
BUN SERPL-MCNC: 15 MG/DL (ref 8–23)
CALCIUM SERPL-MCNC: 9.6 MG/DL (ref 8.3–10.4)
CHLORIDE SERPL-SCNC: 109 MMOL/L (ref 101–110)
CO2 SERPL-SCNC: 24 MMOL/L (ref 21–32)
CREAT SERPL-MCNC: 1.4 MG/DL (ref 0.8–1.5)
EST. AVERAGE GLUCOSE BLD GHB EST-MCNC: 114 MG/DL
GLOBULIN SER CALC-MCNC: 3.2 G/DL (ref 2.8–4.5)
GLUCOSE SERPL-MCNC: 102 MG/DL (ref 65–100)
HBA1C MFR BLD: 5.6 % (ref 4.8–5.6)
POTASSIUM SERPL-SCNC: 4.3 MMOL/L (ref 3.5–5.1)
PROT SERPL-MCNC: 7.4 G/DL (ref 6.3–8.2)
SODIUM SERPL-SCNC: 139 MMOL/L (ref 133–143)
TSH, 3RD GENERATION: 2.17 UIU/ML (ref 0.36–3.74)

## 2023-07-13 ENCOUNTER — OFFICE VISIT (OUTPATIENT)
Dept: FAMILY MEDICINE CLINIC | Facility: CLINIC | Age: 78
End: 2023-07-13
Payer: COMMERCIAL

## 2023-07-13 VITALS
HEIGHT: 73 IN | DIASTOLIC BLOOD PRESSURE: 66 MMHG | HEART RATE: 71 BPM | WEIGHT: 176 LBS | SYSTOLIC BLOOD PRESSURE: 130 MMHG | OXYGEN SATURATION: 94 % | BODY MASS INDEX: 23.33 KG/M2

## 2023-07-13 DIAGNOSIS — N18.31 STAGE 3A CHRONIC KIDNEY DISEASE (HCC): Chronic | ICD-10-CM

## 2023-07-13 DIAGNOSIS — H61.23 BILATERAL IMPACTED CERUMEN: ICD-10-CM

## 2023-07-13 DIAGNOSIS — I10 BENIGN ESSENTIAL HTN: Primary | Chronic | ICD-10-CM

## 2023-07-13 DIAGNOSIS — R73.02 IGT (IMPAIRED GLUCOSE TOLERANCE): Chronic | ICD-10-CM

## 2023-07-13 DIAGNOSIS — N40.1 BENIGN PROSTATIC HYPERPLASIA WITH NOCTURIA: ICD-10-CM

## 2023-07-13 DIAGNOSIS — E03.9 HYPOTHYROIDISM (ACQUIRED): Chronic | ICD-10-CM

## 2023-07-13 DIAGNOSIS — Z79.899 HIGH RISK MEDICATION USE: Chronic | ICD-10-CM

## 2023-07-13 DIAGNOSIS — R35.1 BENIGN PROSTATIC HYPERPLASIA WITH NOCTURIA: ICD-10-CM

## 2023-07-13 PROBLEM — R74.8 ELEVATED LIVER ENZYMES: Status: RESOLVED | Noted: 2022-07-12 | Resolved: 2023-07-13

## 2023-07-13 PROCEDURE — 1123F ACP DISCUSS/DSCN MKR DOCD: CPT | Performed by: FAMILY MEDICINE

## 2023-07-13 PROCEDURE — 99214 OFFICE O/P EST MOD 30 MIN: CPT | Performed by: FAMILY MEDICINE

## 2023-07-13 PROCEDURE — 3078F DIAST BP <80 MM HG: CPT | Performed by: FAMILY MEDICINE

## 2023-07-13 PROCEDURE — 69210 REMOVE IMPACTED EAR WAX UNI: CPT | Performed by: FAMILY MEDICINE

## 2023-07-13 PROCEDURE — 3075F SYST BP GE 130 - 139MM HG: CPT | Performed by: FAMILY MEDICINE

## 2023-07-13 RX ORDER — FINASTERIDE 5 MG/1
5 TABLET, FILM COATED ORAL DAILY
Qty: 90 TABLET | Refills: 3 | Status: SHIPPED | OUTPATIENT
Start: 2023-07-13

## 2023-07-13 RX ORDER — LEVOTHYROXINE SODIUM 137 UG/1
137 TABLET ORAL
Qty: 90 TABLET | Refills: 3 | Status: SHIPPED | OUTPATIENT
Start: 2023-07-13

## 2023-07-13 SDOH — ECONOMIC STABILITY: HOUSING INSECURITY
IN THE LAST 12 MONTHS, WAS THERE A TIME WHEN YOU DID NOT HAVE A STEADY PLACE TO SLEEP OR SLEPT IN A SHELTER (INCLUDING NOW)?: NO

## 2023-07-13 SDOH — ECONOMIC STABILITY: INCOME INSECURITY: HOW HARD IS IT FOR YOU TO PAY FOR THE VERY BASICS LIKE FOOD, HOUSING, MEDICAL CARE, AND HEATING?: NOT HARD AT ALL

## 2023-07-13 SDOH — ECONOMIC STABILITY: FOOD INSECURITY: WITHIN THE PAST 12 MONTHS, YOU WORRIED THAT YOUR FOOD WOULD RUN OUT BEFORE YOU GOT MONEY TO BUY MORE.: NEVER TRUE

## 2023-07-13 SDOH — ECONOMIC STABILITY: FOOD INSECURITY: WITHIN THE PAST 12 MONTHS, THE FOOD YOU BOUGHT JUST DIDN'T LAST AND YOU DIDN'T HAVE MONEY TO GET MORE.: NEVER TRUE

## 2023-07-13 ASSESSMENT — PATIENT HEALTH QUESTIONNAIRE - PHQ9
SUM OF ALL RESPONSES TO PHQ QUESTIONS 1-9: 0
SUM OF ALL RESPONSES TO PHQ QUESTIONS 1-9: 0
2. FEELING DOWN, DEPRESSED OR HOPELESS: 0
SUM OF ALL RESPONSES TO PHQ QUESTIONS 1-9: 0
SUM OF ALL RESPONSES TO PHQ QUESTIONS 1-9: 0
SUM OF ALL RESPONSES TO PHQ9 QUESTIONS 1 & 2: 0
1. LITTLE INTEREST OR PLEASURE IN DOING THINGS: 0

## 2023-07-13 ASSESSMENT — ENCOUNTER SYMPTOMS
SHORTNESS OF BREATH: 0
DIARRHEA: 0
ABDOMINAL PAIN: 0
WHEEZING: 0
ROS SKIN COMMENTS: EASY BRUISING
VOMITING: 0
EYES NEGATIVE: 1
COUGH: 0
BACK PAIN: 0
ALLERGIC/IMMUNOLOGIC NEGATIVE: 1
CHEST TIGHTNESS: 0
NAUSEA: 0
CONSTIPATION: 0

## 2023-07-13 NOTE — PROGRESS NOTES
PM 4:31 PM 3:42 PM   Sodium      133 - 143 mmol/L 139  140   Potassium      3.5 - 5.1 mmol/L 4.3  4.5   Chloride      101 - 110 mmol/L 109  109 (H)   CO2      21 - 32 mmol/L 24  22   Anion Gap      2 - 11 mmol/L 6  9   Glucose, Random      65 - 100 mg/dL 102 (H)  102 (H)   BUN,BUNPL      8 - 23 MG/DL 15  24 (H)   Creatinine      0.8 - 1.5 MG/DL 1.40  1.50   GFR African American      >60 ml/min/1.73m2   58 (L)   EGFR IF NonAfrican American      >60 ml/min/1.73m2      CALCIUM, SERUM, 724501      8.3 - 10.4 MG/DL 9.6  9.5   BILIRUBIN TOTAL      0.2 - 1.1 MG/DL 1.0 0.8    ALT      12 - 65 U/L 31 27    AST      15 - 37 U/L 15 15    Alk Phos      50 - 136 U/L 76 84    Total Protein      6.3 - 8.2 g/dL 7.4 7.8    Albumin      3.2 - 4.6 g/dL 4.2 4.4    Globulin      2.8 - 4.5 g/dL 3.2 3.4    Albumin/Globulin Ratio      1.2 - 3.5      Est, Glom Filt Rate      >60 ml/min/1.73m2 51 (L)     ALBUMIN/GLOBULIN RATIO      0.4 - 1.6 1.3 1.3    Bun/Cre Ratio      10 - 24 NA      GFR Non-African American      >60 ml/min/1.73m2   48 (L)   Bilirubin, Direct      <0.4 MG/DL  0.2        Subjective    HPI:     This is a 65-year-old male patient here today for annual follow-up on several medical conditions. The patient had labs in advance of today's visit, the results of which were discussed with him/her at length. Thyroid function normal.  We will continue the same dose. Renal function has deteriorated somewhat. The patient admits he is not good about drinking water. Encouraged intentional hydration! The patient declines shingles vaccine and booster COVID vaccine. Reviewed and updated this visit by provider:           Review of Systems   Constitutional:  Negative for fatigue and fever. HENT: Negative. Eyes: Negative. Respiratory:  Negative for cough, chest tightness, shortness of breath and wheezing. Cardiovascular:  Negative for chest pain and leg swelling.    Gastrointestinal:  Negative for abdominal pain,

## 2023-10-11 DIAGNOSIS — K21.9 GASTROESOPHAGEAL REFLUX DISEASE WITHOUT ESOPHAGITIS: ICD-10-CM

## 2023-10-11 RX ORDER — OMEPRAZOLE 20 MG/1
CAPSULE, DELAYED RELEASE ORAL
Qty: 90 CAPSULE | Refills: 3 | Status: SHIPPED | OUTPATIENT
Start: 2023-10-11

## 2024-05-11 ENCOUNTER — APPOINTMENT (OUTPATIENT)
Dept: GENERAL RADIOLOGY | Age: 79
End: 2024-05-11
Payer: COMMERCIAL

## 2024-05-11 ENCOUNTER — HOSPITAL ENCOUNTER (EMERGENCY)
Age: 79
Discharge: HOME | End: 2024-05-11
Attending: STUDENT IN AN ORGANIZED HEALTH CARE EDUCATION/TRAINING PROGRAM
Payer: COMMERCIAL

## 2024-05-11 VITALS
BODY MASS INDEX: 24.38 KG/M2 | DIASTOLIC BLOOD PRESSURE: 82 MMHG | WEIGHT: 180 LBS | OXYGEN SATURATION: 92 % | SYSTOLIC BLOOD PRESSURE: 152 MMHG | HEIGHT: 72 IN | HEART RATE: 92 BPM | RESPIRATION RATE: 19 BRPM | TEMPERATURE: 98.1 F

## 2024-05-11 DIAGNOSIS — L03.818 CELLULITIS OF OTHER SPECIFIED SITE: ICD-10-CM

## 2024-05-11 DIAGNOSIS — M25.531 RIGHT WRIST PAIN: Primary | ICD-10-CM

## 2024-05-11 DIAGNOSIS — R79.82 ELEVATED C-REACTIVE PROTEIN (CRP): ICD-10-CM

## 2024-05-11 LAB
ALBUMIN SERPL-MCNC: 4.6 G/DL (ref 3.2–4.6)
ALBUMIN/GLOB SERPL: 1.2 (ref 1–1.9)
ALP SERPL-CCNC: 111 U/L (ref 40–129)
ALT SERPL-CCNC: 20 U/L (ref 12–65)
ANION GAP SERPL CALC-SCNC: 13 MMOL/L (ref 9–18)
AST SERPL-CCNC: 31 U/L (ref 15–37)
BASOPHILS # BLD: 0.1 K/UL (ref 0–0.2)
BASOPHILS NFR BLD: 0 % (ref 0–2)
BILIRUB SERPL-MCNC: 0.7 MG/DL (ref 0–1.2)
BUN SERPL-MCNC: 16 MG/DL (ref 8–23)
CALCIUM SERPL-MCNC: 9.9 MG/DL (ref 8.8–10.2)
CHLORIDE SERPL-SCNC: 104 MMOL/L (ref 98–107)
CO2 SERPL-SCNC: 24 MMOL/L (ref 20–28)
CREAT SERPL-MCNC: 1.25 MG/DL (ref 0.8–1.3)
CRP SERPL HS-MCNC: 19 MG/L (ref 0–3)
DIFFERENTIAL METHOD BLD: ABNORMAL
EOSINOPHIL # BLD: 0.2 K/UL (ref 0–0.8)
EOSINOPHIL NFR BLD: 1 % (ref 0.5–7.8)
ERYTHROCYTE [DISTWIDTH] IN BLOOD BY AUTOMATED COUNT: 12.3 % (ref 11.9–14.6)
ERYTHROCYTE [SEDIMENTATION RATE] IN BLOOD: 4 MM/HR
GLOBULIN SER CALC-MCNC: 3.7 G/DL (ref 2.3–3.5)
GLUCOSE SERPL-MCNC: 131 MG/DL (ref 70–99)
HCT VFR BLD AUTO: 47.2 % (ref 41.1–50.3)
HGB BLD-MCNC: 15.7 G/DL (ref 13.6–17.2)
IMM GRANULOCYTES # BLD AUTO: 0 K/UL (ref 0–0.5)
IMM GRANULOCYTES NFR BLD AUTO: 0 % (ref 0–5)
LYMPHOCYTES # BLD: 1.3 K/UL (ref 0.5–4.6)
LYMPHOCYTES NFR BLD: 12 % (ref 13–44)
MCH RBC QN AUTO: 31.3 PG (ref 26.1–32.9)
MCHC RBC AUTO-ENTMCNC: 33.3 G/DL (ref 31.4–35)
MCV RBC AUTO: 94.2 FL (ref 82–102)
MONOCYTES # BLD: 0.8 K/UL (ref 0.1–1.3)
MONOCYTES NFR BLD: 7 % (ref 4–12)
NEUTS SEG # BLD: 8.8 K/UL (ref 1.7–8.2)
NEUTS SEG NFR BLD: 80 % (ref 43–78)
NRBC # BLD: 0 K/UL (ref 0–0.2)
PLATELET # BLD AUTO: 284 K/UL (ref 150–450)
PMV BLD AUTO: 10.3 FL (ref 9.4–12.3)
POTASSIUM SERPL-SCNC: 4.7 MMOL/L (ref 3.5–5.1)
PROT SERPL-MCNC: 8.3 G/DL (ref 6.3–8.2)
RBC # BLD AUTO: 5.01 M/UL (ref 4.23–5.6)
SODIUM SERPL-SCNC: 140 MMOL/L (ref 136–145)
URATE SERPL-MCNC: 7 MG/DL (ref 3.9–8.2)
WBC # BLD AUTO: 11.1 K/UL (ref 4.3–11.1)

## 2024-05-11 PROCEDURE — 86141 C-REACTIVE PROTEIN HS: CPT

## 2024-05-11 PROCEDURE — 85652 RBC SED RATE AUTOMATED: CPT

## 2024-05-11 PROCEDURE — 96366 THER/PROPH/DIAG IV INF ADDON: CPT

## 2024-05-11 PROCEDURE — 73100 X-RAY EXAM OF WRIST: CPT

## 2024-05-11 PROCEDURE — 96365 THER/PROPH/DIAG IV INF INIT: CPT

## 2024-05-11 PROCEDURE — 96367 TX/PROPH/DG ADDL SEQ IV INF: CPT

## 2024-05-11 PROCEDURE — 80053 COMPREHEN METABOLIC PANEL: CPT

## 2024-05-11 PROCEDURE — 84550 ASSAY OF BLOOD/URIC ACID: CPT

## 2024-05-11 PROCEDURE — 2580000003 HC RX 258: Performed by: STUDENT IN AN ORGANIZED HEALTH CARE EDUCATION/TRAINING PROGRAM

## 2024-05-11 PROCEDURE — 96361 HYDRATE IV INFUSION ADD-ON: CPT

## 2024-05-11 PROCEDURE — 6360000002 HC RX W HCPCS: Performed by: STUDENT IN AN ORGANIZED HEALTH CARE EDUCATION/TRAINING PROGRAM

## 2024-05-11 PROCEDURE — 87040 BLOOD CULTURE FOR BACTERIA: CPT

## 2024-05-11 PROCEDURE — 96375 TX/PRO/DX INJ NEW DRUG ADDON: CPT

## 2024-05-11 PROCEDURE — 99284 EMERGENCY DEPT VISIT MOD MDM: CPT

## 2024-05-11 PROCEDURE — 85025 COMPLETE CBC W/AUTO DIFF WBC: CPT

## 2024-05-11 RX ORDER — DEXAMETHASONE SODIUM PHOSPHATE 10 MG/ML
10 INJECTION INTRAMUSCULAR; INTRAVENOUS EVERY 6 HOURS
Status: COMPLETED | OUTPATIENT
Start: 2024-05-11 | End: 2024-05-11

## 2024-05-11 RX ORDER — IBUPROFEN 600 MG/1
600 TABLET ORAL 3 TIMES DAILY PRN
Qty: 30 TABLET | Refills: 0 | Status: SHIPPED | OUTPATIENT
Start: 2024-05-11

## 2024-05-11 RX ORDER — DOXYCYCLINE HYCLATE 100 MG
100 TABLET ORAL 2 TIMES DAILY
Qty: 14 TABLET | Refills: 0 | Status: SHIPPED | OUTPATIENT
Start: 2024-05-11 | End: 2024-05-18

## 2024-05-11 RX ORDER — SODIUM CHLORIDE, SODIUM LACTATE, POTASSIUM CHLORIDE, AND CALCIUM CHLORIDE .6; .31; .03; .02 G/100ML; G/100ML; G/100ML; G/100ML
1000 INJECTION, SOLUTION INTRAVENOUS ONCE
Status: COMPLETED | OUTPATIENT
Start: 2024-05-11 | End: 2024-05-11

## 2024-05-11 RX ORDER — KETOROLAC TROMETHAMINE 15 MG/ML
15 INJECTION, SOLUTION INTRAMUSCULAR; INTRAVENOUS
Status: COMPLETED | OUTPATIENT
Start: 2024-05-11 | End: 2024-05-11

## 2024-05-11 RX ADMIN — KETOROLAC TROMETHAMINE 15 MG: 15 INJECTION, SOLUTION INTRAMUSCULAR; INTRAVENOUS at 19:06

## 2024-05-11 RX ADMIN — DEXAMETHASONE SODIUM PHOSPHATE 10 MG: 10 INJECTION INTRAMUSCULAR; INTRAVENOUS at 19:06

## 2024-05-11 RX ADMIN — PIPERACILLIN AND TAZOBACTAM 4500 MG: 4; .5 INJECTION, POWDER, LYOPHILIZED, FOR SOLUTION INTRAVENOUS at 20:26

## 2024-05-11 RX ADMIN — SODIUM CHLORIDE, POTASSIUM CHLORIDE, SODIUM LACTATE AND CALCIUM CHLORIDE 1000 ML: 600; 310; 30; 20 INJECTION, SOLUTION INTRAVENOUS at 19:03

## 2024-05-11 RX ADMIN — VANCOMYCIN HYDROCHLORIDE 2000 MG: 10 INJECTION, POWDER, LYOPHILIZED, FOR SOLUTION INTRAVENOUS at 20:59

## 2024-05-11 ASSESSMENT — PAIN DESCRIPTION - ORIENTATION
ORIENTATION: RIGHT
ORIENTATION: RIGHT

## 2024-05-11 ASSESSMENT — PAIN SCALES - GENERAL
PAINLEVEL_OUTOF10: 8
PAINLEVEL_OUTOF10: 8

## 2024-05-11 ASSESSMENT — LIFESTYLE VARIABLES
HOW MANY STANDARD DRINKS CONTAINING ALCOHOL DO YOU HAVE ON A TYPICAL DAY: PATIENT DOES NOT DRINK
HOW OFTEN DO YOU HAVE A DRINK CONTAINING ALCOHOL: NEVER

## 2024-05-11 ASSESSMENT — PAIN DESCRIPTION - DESCRIPTORS: DESCRIPTORS: ACHING

## 2024-05-11 ASSESSMENT — PAIN DESCRIPTION - LOCATION
LOCATION: WRIST
LOCATION: WRIST

## 2024-05-11 NOTE — ED TRIAGE NOTES
Pt presents ambulatory to Triage with complaints of sudden onset right wrist that started last night and has been worsening today. Pt denies injury or trauma. Reports hx of gout and states this feels similar.

## 2024-05-11 NOTE — ED PROVIDER NOTES
tachycardic in the 120s, patient now resting comfortably at 80 bpm on the cardiac monitor. [EM]      ED Course User Index  [EM] Juan Miguel Solis III, MD     1 acute, uncomplicated illness or injury.  Patient was discharged risks and benefits of hospitalization were considered.    I independently ordered and reviewed each unique test.  I reviewed external records: provider visit note from PCP.             Exclusion criteria - the patient is NOT to be included for SEP-1 Core Measure due to: 2+ SIRS criteria are not met       History     Patient is a 78-year-old male with a past medical history of gout, anxiety, GERD, here today complaining of atraumatic right wrist pain x 1 day.  Patient states that he is currently recovering from gout in his right foot after a long bike ride.  Patient denies any trauma to the right foot, denies further sick symptoms        Physical Exam     Vitals signs and nursing note reviewed:  Vitals:    05/11/24 2145 05/11/24 2200 05/11/24 2215 05/11/24 2230   BP: (!) 162/91 (!) 158/74 (!) 150/79 (!) 155/86   Pulse:    92   Resp:       Temp:       TempSrc:       SpO2: 95% 99% 95% 97%   Weight:       Height:          Physical Exam  Vitals and nursing note reviewed.   Constitutional:       Appearance: Normal appearance.   HENT:      Head: Normocephalic and atraumatic.      Right Ear: External ear normal.      Left Ear: External ear normal.      Nose: Nose normal.      Mouth/Throat:      Mouth: Mucous membranes are moist.      Pharynx: Oropharynx is clear.   Eyes:      Extraocular Movements: Extraocular movements intact.      Pupils: Pupils are equal, round, and reactive to light.   Cardiovascular:      Rate and Rhythm: Regular rhythm. Tachycardia present.      Pulses: Normal pulses.      Heart sounds: Normal heart sounds.   Pulmonary:      Effort: Pulmonary effort is normal.      Breath sounds: Normal breath sounds.   Abdominal:      General: Abdomen is flat.      Palpations: Abdomen is soft.  mmol/L    Chloride 104 98 - 107 mmol/L    CO2 24 20 - 28 mmol/L    Anion Gap 13 9 - 18 mmol/L    Glucose 131 (H) 70 - 99 mg/dL    BUN 16 8 - 23 MG/DL    Creatinine 1.25 0.80 - 1.30 MG/DL    Est, Glom Filt Rate 59 (L) >60 ml/min/1.73m2    Calcium 9.9 8.8 - 10.2 MG/DL    Total Bilirubin 0.7 0.0 - 1.2 MG/DL    ALT 20 12 - 65 U/L    AST 31 15 - 37 U/L    Alk Phosphatase 111 40 - 129 U/L    Total Protein 8.3 (H) 6.3 - 8.2 g/dL    Albumin 4.6 3.2 - 4.6 g/dL    Globulin 3.7 (H) 2.3 - 3.5 g/dL    Albumin/Globulin Ratio 1.2 1.0 - 1.9     High sensitivity CRP   Result Value Ref Range    CRP, High Sensitivity 19.0 (H) 0.0 - 3.0 mg/L   Sedimentation Rate   Result Value Ref Range    Sed Rate, Automated 4 (L) 15 mm/hr   Uric Acid   Result Value Ref Range    Uric Acid 7.0 3.9 - 8.2 MG/DL         XR WRIST RIGHT (2 VIEWS)   Final Result   No evidence of fracture or dislocation.      Mild soft tissue swelling adjacent to the distal ulna.      Mild degenerative changes.                      No results for input(s): \"COVID19\" in the last 72 hours.     Voice dictation software was used during the making of this note.  This software is not perfect and grammatical and other typographical errors may be present.  This note has not been completely proofread for errors.     Juan Miguel Solis III, MD  05/11/24 3099

## 2024-05-12 LAB
BACTERIA SPEC CULT: NORMAL
BACTERIA SPEC CULT: NORMAL
SERVICE CMNT-IMP: NORMAL
SERVICE CMNT-IMP: NORMAL

## 2024-05-12 NOTE — DISCHARGE INSTRUCTIONS
Take antibiotics as prescribed to completion, return if your symptoms acutely worsen.  Follow-up with your primary care physician for further management

## 2024-05-12 NOTE — ED NOTES
Patient mobility status  with no difficulty. Provider aware     I have reviewed discharge instructions with the patient.  The patient verbalized understanding.    Patient left ED via Discharge Method: ambulatory to Home with  self .    Opportunity for questions and clarification provided.     Patient given 2 scripts.            Tea Leon, DOROTEO  05/11/24 5014

## 2024-07-15 RX ORDER — OMEPRAZOLE 20 MG/1
CAPSULE, DELAYED RELEASE ORAL
Qty: 90 CAPSULE | Refills: 3 | Status: CANCELLED | OUTPATIENT
Start: 2024-07-15

## 2024-07-16 ENCOUNTER — OFFICE VISIT (OUTPATIENT)
Dept: FAMILY MEDICINE CLINIC | Facility: CLINIC | Age: 79
End: 2024-07-16
Payer: COMMERCIAL

## 2024-07-16 VITALS
BODY MASS INDEX: 24.27 KG/M2 | HEIGHT: 72 IN | WEIGHT: 179.2 LBS | SYSTOLIC BLOOD PRESSURE: 156 MMHG | OXYGEN SATURATION: 96 % | DIASTOLIC BLOOD PRESSURE: 84 MMHG | HEART RATE: 81 BPM

## 2024-07-16 DIAGNOSIS — E03.9 HYPOTHYROIDISM (ACQUIRED): ICD-10-CM

## 2024-07-16 DIAGNOSIS — N40.1 BENIGN PROSTATIC HYPERPLASIA WITH NOCTURIA: ICD-10-CM

## 2024-07-16 DIAGNOSIS — K21.9 GASTROESOPHAGEAL REFLUX DISEASE WITHOUT ESOPHAGITIS: ICD-10-CM

## 2024-07-16 DIAGNOSIS — Z12.5 SCREENING FOR PROSTATE CANCER: ICD-10-CM

## 2024-07-16 DIAGNOSIS — I10 BENIGN ESSENTIAL HTN: Primary | ICD-10-CM

## 2024-07-16 DIAGNOSIS — N18.31 STAGE 3A CHRONIC KIDNEY DISEASE (HCC): Chronic | ICD-10-CM

## 2024-07-16 DIAGNOSIS — R35.1 BENIGN PROSTATIC HYPERPLASIA WITH NOCTURIA: ICD-10-CM

## 2024-07-16 DIAGNOSIS — I10 BENIGN ESSENTIAL HTN: ICD-10-CM

## 2024-07-16 LAB
BASOPHILS # BLD: 0.1 K/UL (ref 0–0.2)
BASOPHILS NFR BLD: 1 % (ref 0–2)
CHOLEST SERPL-MCNC: 206 MG/DL (ref 0–200)
DIFFERENTIAL METHOD BLD: NORMAL
EOSINOPHIL # BLD: 0.2 K/UL (ref 0–0.8)
EOSINOPHIL NFR BLD: 3 % (ref 0.5–7.8)
ERYTHROCYTE [DISTWIDTH] IN BLOOD BY AUTOMATED COUNT: 12.7 % (ref 11.9–14.6)
HCT VFR BLD AUTO: 48 % (ref 41.1–50.3)
HDLC SERPL-MCNC: 56 MG/DL (ref 40–60)
HDLC SERPL: 3.7 (ref 0–5)
HGB BLD-MCNC: 16 G/DL (ref 13.6–17.2)
IMM GRANULOCYTES # BLD AUTO: 0 K/UL (ref 0–0.5)
IMM GRANULOCYTES NFR BLD AUTO: 0 % (ref 0–5)
LDLC SERPL CALC-MCNC: 118 MG/DL (ref 0–100)
LYMPHOCYTES # BLD: 1.5 K/UL (ref 0.5–4.6)
LYMPHOCYTES NFR BLD: 20 % (ref 13–44)
MCH RBC QN AUTO: 32.4 PG (ref 26.1–32.9)
MCHC RBC AUTO-ENTMCNC: 33.3 G/DL (ref 31.4–35)
MCV RBC AUTO: 97.2 FL (ref 82–102)
MONOCYTES # BLD: 0.5 K/UL (ref 0.1–1.3)
MONOCYTES NFR BLD: 7 % (ref 4–12)
NEUTS SEG # BLD: 5 K/UL (ref 1.7–8.2)
NEUTS SEG NFR BLD: 69 % (ref 43–78)
NRBC # BLD: 0 K/UL (ref 0–0.2)
PLATELET # BLD AUTO: 186 K/UL (ref 150–450)
PMV BLD AUTO: 11.8 FL (ref 9.4–12.3)
PSA SERPL-MCNC: 1 NG/ML (ref 0–4)
RBC # BLD AUTO: 4.94 M/UL (ref 4.23–5.6)
TRIGL SERPL-MCNC: 161 MG/DL (ref 0–150)
TSH, 3RD GENERATION: 0.54 UIU/ML (ref 0.27–4.2)
VLDLC SERPL CALC-MCNC: 32 MG/DL (ref 6–23)
WBC # BLD AUTO: 7.3 K/UL (ref 4.3–11.1)

## 2024-07-16 PROCEDURE — 3079F DIAST BP 80-89 MM HG: CPT | Performed by: FAMILY MEDICINE

## 2024-07-16 PROCEDURE — 3077F SYST BP >= 140 MM HG: CPT | Performed by: FAMILY MEDICINE

## 2024-07-16 PROCEDURE — 1123F ACP DISCUSS/DSCN MKR DOCD: CPT | Performed by: FAMILY MEDICINE

## 2024-07-16 PROCEDURE — 99214 OFFICE O/P EST MOD 30 MIN: CPT | Performed by: FAMILY MEDICINE

## 2024-07-16 RX ORDER — IRBESARTAN 300 MG/1
300 TABLET ORAL DAILY
Qty: 90 TABLET | Refills: 3 | Status: SHIPPED | OUTPATIENT
Start: 2024-07-16 | End: 2025-07-16

## 2024-07-16 RX ORDER — LEVOTHYROXINE SODIUM 137 UG/1
137 TABLET ORAL
Qty: 90 TABLET | Refills: 3 | Status: SHIPPED | OUTPATIENT
Start: 2024-07-16 | End: 2025-07-16

## 2024-07-16 RX ORDER — FINASTERIDE 5 MG/1
5 TABLET, FILM COATED ORAL DAILY
Qty: 90 TABLET | Refills: 3 | Status: SHIPPED | OUTPATIENT
Start: 2024-07-16 | End: 2025-07-16

## 2024-07-16 RX ORDER — AMLODIPINE BESYLATE 10 MG/1
10 TABLET ORAL DAILY
Qty: 90 TABLET | Refills: 3 | Status: SHIPPED | OUTPATIENT
Start: 2024-07-16 | End: 2025-07-16

## 2024-07-16 SDOH — ECONOMIC STABILITY: INCOME INSECURITY: HOW HARD IS IT FOR YOU TO PAY FOR THE VERY BASICS LIKE FOOD, HOUSING, MEDICAL CARE, AND HEATING?: NOT HARD AT ALL

## 2024-07-16 SDOH — ECONOMIC STABILITY: FOOD INSECURITY: WITHIN THE PAST 12 MONTHS, YOU WORRIED THAT YOUR FOOD WOULD RUN OUT BEFORE YOU GOT MONEY TO BUY MORE.: NEVER TRUE

## 2024-07-16 SDOH — ECONOMIC STABILITY: FOOD INSECURITY: WITHIN THE PAST 12 MONTHS, THE FOOD YOU BOUGHT JUST DIDN'T LAST AND YOU DIDN'T HAVE MONEY TO GET MORE.: NEVER TRUE

## 2024-07-16 ASSESSMENT — PATIENT HEALTH QUESTIONNAIRE - PHQ9
SUM OF ALL RESPONSES TO PHQ QUESTIONS 1-9: 0
SUM OF ALL RESPONSES TO PHQ9 QUESTIONS 1 & 2: 0
SUM OF ALL RESPONSES TO PHQ QUESTIONS 1-9: 0
2. FEELING DOWN, DEPRESSED OR HOPELESS: NOT AT ALL
1. LITTLE INTEREST OR PLEASURE IN DOING THINGS: NOT AT ALL
SUM OF ALL RESPONSES TO PHQ QUESTIONS 1-9: 0
SUM OF ALL RESPONSES TO PHQ QUESTIONS 1-9: 0

## 2024-07-16 ASSESSMENT — ENCOUNTER SYMPTOMS: RESPIRATORY NEGATIVE: 1

## 2024-07-16 NOTE — PROGRESS NOTES
Metabolic Panel; Future  2. Stage 3a chronic kidney disease (HCC), patient understands renal precautions  3. Hypothyroidism (acquired), historically adequately replaced  -     TSH; Future   4. Benign prostatic hyperplasia with nocturia  Comments:  Refill finasteride.  Orders:  -     finasteride (PROSCAR) 5 MG tablet; Take 1 tablet by mouth daily, Disp-90 tablet, R-3Normal  5. Gastroesophageal reflux disease without esophagitis  6. Screening for prostate cancer  -     PSA Screening; Future         This document was created using voice recognition software, inadvertent mistakes are possible, please keep this in mind with words that appear out of context and/or nonsensical. For any concerns about the wording of this document, please contact its creator for further clarification.        Return in about 1 year (around 7/16/2025) for f/u with labs prior.       Rick Paiz MD   
6

## 2024-11-04 DIAGNOSIS — K21.9 GASTROESOPHAGEAL REFLUX DISEASE WITHOUT ESOPHAGITIS: ICD-10-CM

## 2025-07-11 ENCOUNTER — OFFICE VISIT (OUTPATIENT)
Dept: FAMILY MEDICINE CLINIC | Facility: CLINIC | Age: 80
End: 2025-07-11
Payer: COMMERCIAL

## 2025-07-11 VITALS
HEART RATE: 92 BPM | WEIGHT: 173 LBS | TEMPERATURE: 98.1 F | OXYGEN SATURATION: 96 % | RESPIRATION RATE: 18 BRPM | DIASTOLIC BLOOD PRESSURE: 60 MMHG | BODY MASS INDEX: 23.46 KG/M2 | SYSTOLIC BLOOD PRESSURE: 124 MMHG

## 2025-07-11 DIAGNOSIS — I10 BENIGN ESSENTIAL HTN: ICD-10-CM

## 2025-07-11 DIAGNOSIS — E03.9 HYPOTHYROIDISM (ACQUIRED): Chronic | ICD-10-CM

## 2025-07-11 DIAGNOSIS — Z12.5 SCREENING FOR PROSTATE CANCER: ICD-10-CM

## 2025-07-11 DIAGNOSIS — N18.31 STAGE 3A CHRONIC KIDNEY DISEASE (HCC): Chronic | ICD-10-CM

## 2025-07-11 DIAGNOSIS — K21.9 GASTROESOPHAGEAL REFLUX DISEASE WITHOUT ESOPHAGITIS: ICD-10-CM

## 2025-07-11 DIAGNOSIS — H53.131 SUDDEN VISUAL LOSS OF RIGHT EYE: Primary | ICD-10-CM

## 2025-07-11 DIAGNOSIS — R35.1 BENIGN PROSTATIC HYPERPLASIA WITH NOCTURIA: ICD-10-CM

## 2025-07-11 DIAGNOSIS — N40.1 BENIGN PROSTATIC HYPERPLASIA WITH NOCTURIA: ICD-10-CM

## 2025-07-11 DIAGNOSIS — E78.00 HYPERCHOLESTEROLEMIA: ICD-10-CM

## 2025-07-11 LAB
ALBUMIN SERPL-MCNC: 4.3 G/DL (ref 3.2–4.6)
ALBUMIN/GLOB SERPL: 1.4 (ref 1–1.9)
ALP SERPL-CCNC: 89 U/L (ref 40–129)
ALT SERPL-CCNC: 27 U/L (ref 8–55)
ANION GAP SERPL CALC-SCNC: 14 MMOL/L (ref 7–16)
AST SERPL-CCNC: 22 U/L (ref 15–37)
BASOPHILS # BLD: 0.03 K/UL (ref 0–0.2)
BASOPHILS NFR BLD: 0.4 % (ref 0–2)
BILIRUB SERPL-MCNC: 1 MG/DL (ref 0–1.2)
BUN SERPL-MCNC: 16 MG/DL (ref 8–23)
CALCIUM SERPL-MCNC: 10.1 MG/DL (ref 8.8–10.2)
CHLORIDE SERPL-SCNC: 105 MMOL/L (ref 98–107)
CO2 SERPL-SCNC: 22 MMOL/L (ref 20–29)
CREAT SERPL-MCNC: 1.43 MG/DL (ref 0.8–1.3)
DIFFERENTIAL METHOD BLD: ABNORMAL
EOSINOPHIL # BLD: 0.03 K/UL (ref 0–0.8)
EOSINOPHIL NFR BLD: 0.4 % (ref 0.5–7.8)
ERYTHROCYTE [DISTWIDTH] IN BLOOD BY AUTOMATED COUNT: 12 % (ref 11.9–14.6)
GLOBULIN SER CALC-MCNC: 3.2 G/DL (ref 2.3–3.5)
GLUCOSE SERPL-MCNC: 121 MG/DL (ref 70–99)
HCT VFR BLD AUTO: 45 % (ref 41.1–50.3)
HGB BLD-MCNC: 15.6 G/DL (ref 13.6–17.2)
IMM GRANULOCYTES # BLD AUTO: 0.03 K/UL (ref 0–0.5)
IMM GRANULOCYTES NFR BLD AUTO: 0.4 % (ref 0–5)
LYMPHOCYTES # BLD: 1.23 K/UL (ref 0.5–4.6)
LYMPHOCYTES NFR BLD: 15.4 % (ref 13–44)
MCH RBC QN AUTO: 33.4 PG (ref 26.1–32.9)
MCHC RBC AUTO-ENTMCNC: 34.7 G/DL (ref 31.4–35)
MCV RBC AUTO: 96.4 FL (ref 82–102)
MONOCYTES # BLD: 0.51 K/UL (ref 0.1–1.3)
MONOCYTES NFR BLD: 6.4 % (ref 4–12)
NEUTS SEG # BLD: 6.14 K/UL (ref 1.7–8.2)
NEUTS SEG NFR BLD: 77 % (ref 43–78)
NRBC # BLD: 0 K/UL (ref 0–0.2)
PLATELET # BLD AUTO: 192 K/UL (ref 150–450)
PMV BLD AUTO: 12 FL (ref 9.4–12.3)
POTASSIUM SERPL-SCNC: 4.2 MMOL/L (ref 3.5–5.1)
PROT SERPL-MCNC: 7.5 G/DL (ref 6.3–8.2)
PSA SERPL-MCNC: 1.2 NG/ML (ref 0–4)
RBC # BLD AUTO: 4.67 M/UL (ref 4.23–5.6)
SODIUM SERPL-SCNC: 140 MMOL/L (ref 136–145)
TSH, 3RD GENERATION: 0.96 UIU/ML (ref 0.27–4.2)
WBC # BLD AUTO: 8 K/UL (ref 4.3–11.1)

## 2025-07-11 PROCEDURE — 99214 OFFICE O/P EST MOD 30 MIN: CPT | Performed by: PHYSICIAN ASSISTANT

## 2025-07-11 PROCEDURE — 3074F SYST BP LT 130 MM HG: CPT | Performed by: PHYSICIAN ASSISTANT

## 2025-07-11 PROCEDURE — 1123F ACP DISCUSS/DSCN MKR DOCD: CPT | Performed by: PHYSICIAN ASSISTANT

## 2025-07-11 PROCEDURE — 3078F DIAST BP <80 MM HG: CPT | Performed by: PHYSICIAN ASSISTANT

## 2025-07-11 RX ORDER — AMLODIPINE BESYLATE 10 MG/1
10 TABLET ORAL DAILY
Qty: 90 TABLET | Refills: 1 | Status: SHIPPED | OUTPATIENT
Start: 2025-07-11 | End: 2026-01-07

## 2025-07-11 RX ORDER — LEVOTHYROXINE SODIUM 137 UG/1
137 TABLET ORAL
Qty: 90 TABLET | Refills: 1 | Status: SHIPPED | OUTPATIENT
Start: 2025-07-11 | End: 2026-01-07

## 2025-07-11 RX ORDER — OMEPRAZOLE 20 MG/1
20 CAPSULE, DELAYED RELEASE ORAL DAILY
Qty: 90 CAPSULE | Refills: 1 | Status: SHIPPED | OUTPATIENT
Start: 2025-07-11

## 2025-07-11 RX ORDER — IRBESARTAN 300 MG/1
300 TABLET ORAL DAILY
Qty: 90 TABLET | Refills: 1 | Status: SHIPPED | OUTPATIENT
Start: 2025-07-11 | End: 2026-01-07

## 2025-07-11 RX ORDER — FINASTERIDE 5 MG/1
5 TABLET, FILM COATED ORAL DAILY
Qty: 90 TABLET | Refills: 1 | Status: SHIPPED | OUTPATIENT
Start: 2025-07-11 | End: 2026-01-07

## 2025-07-11 SDOH — ECONOMIC STABILITY: FOOD INSECURITY: WITHIN THE PAST 12 MONTHS, YOU WORRIED THAT YOUR FOOD WOULD RUN OUT BEFORE YOU GOT MONEY TO BUY MORE.: NEVER TRUE

## 2025-07-11 SDOH — ECONOMIC STABILITY: FOOD INSECURITY: WITHIN THE PAST 12 MONTHS, THE FOOD YOU BOUGHT JUST DIDN'T LAST AND YOU DIDN'T HAVE MONEY TO GET MORE.: NEVER TRUE

## 2025-07-11 ASSESSMENT — PATIENT HEALTH QUESTIONNAIRE - PHQ9
SUM OF ALL RESPONSES TO PHQ QUESTIONS 1-9: 0
SUM OF ALL RESPONSES TO PHQ QUESTIONS 1-9: 0
1. LITTLE INTEREST OR PLEASURE IN DOING THINGS: NOT AT ALL
SUM OF ALL RESPONSES TO PHQ QUESTIONS 1-9: 0
SUM OF ALL RESPONSES TO PHQ QUESTIONS 1-9: 0
2. FEELING DOWN, DEPRESSED OR HOPELESS: NOT AT ALL

## 2025-07-11 NOTE — ASSESSMENT & PLAN NOTE
Nocturia is not controlled but pt does not want to f/u with urology and is not concerned about his symptoms. Continue finasteride. Monitor.

## 2025-07-11 NOTE — PROGRESS NOTES
Continue ARB. Monitor.   7. Hypercholesterolemia  Assessment & Plan:  Defer to future PCP to update lipids, as pt is not currently fasting. Not on meds. Monitor.  8. Screening for prostate cancer  -     PSA Screening; Future       Summary:    Acute persistent vision loss medial aspect of R visual field-- MA called [facility] to request prompt appt; pt was given appt for TODAY at 2:30pm. Appt details were given to pt. Stressed importance of going to appt. Pt states he will go. Pt advised not to drive.  As above  Refills provided  Routine labs  Pt to make new pt PCP appt in prompt manner     Will call with results.    Next appointment at [facility]: Visit date not found     Follow-up and Dispositions    Return for needs new pt PCP appt-- pt wants to schedule this in [place] .         MAKENZIE Luis    Total time spent caring for the patient today was 34 minutes. This includes time spent reviewing the chart, face-to-face with the patient, documenting after the visit, reviewing studies, and/or calling the patient on the day of the visit.

## 2025-07-11 NOTE — PATIENT INSTRUCTIONS
[facility]:   [facility] in [place]  2:30pm appointment TODAY (7/11/25)  Bring insurance card and photo ID

## (undated) DEVICE — TROCAR: Brand: KII® SLEEVE

## (undated) DEVICE — INTENDED FOR TISSUE SEPARATION, AND OTHER PROCEDURES THAT REQUIRE A SHARP SURGICAL BLADE TO PUNCTURE OR CUT.: Brand: BARD-PARKER SAFETY BLADES SIZE 11, STERILE

## (undated) DEVICE — SUTURE SZ 0 27IN 5/8 CIR UR-6  TAPER PT VIOLET ABSRB VICRYL J603H

## (undated) DEVICE — TROCAR: Brand: KII FIOS FIRST ENTRY

## (undated) DEVICE — PREP SKN CHLRAPRP APL 26ML STR --

## (undated) DEVICE — GENERAL LAPAROSCOPY: Brand: MEDLINE INDUSTRIES, INC.

## (undated) DEVICE — NEEDLE HYPO 21GA L1.5IN INTRAMUSCULAR S STL LATCH BVL UP

## (undated) DEVICE — APPLIER CLP M/L SHFT DIA5MM 15 LIG LIGAMAX 5

## (undated) DEVICE — LUER-LOK 360°: Brand: CONNECTA, LUER-LOK

## (undated) DEVICE — 2000CC GUARDIAN II: Brand: GUARDIAN

## (undated) DEVICE — CONTAINER SPEC FRMLN 120ML --

## (undated) DEVICE — 2, DISPOSABLE SUCTION/IRRIGATOR WITHOUT DISPOSABLE TIP: Brand: STRYKEFLOW

## (undated) DEVICE — DRAPE,LAP,CHOLE,W/TROUGHS,STERILE: Brand: MEDLINE

## (undated) DEVICE — DRAPE TWL SURG 16X26IN BLU ORB04] ALLCARE INC]

## (undated) DEVICE — LOGICUT SCISSOR LENGTH 320MM: Brand: LOGI - LAPAROSCOPIC INSTRUMENT SYSTEM

## (undated) DEVICE — SYR LR LCK 1ML GRAD NSAF 30ML --

## (undated) DEVICE — SOLUTION IV 1000ML 0.9% SOD CHL

## (undated) DEVICE — GOWN,REINFORCED,POLY,AURORA,XXLARGE,STR: Brand: MEDLINE

## (undated) DEVICE — STRIP,CLOSURE,WOUND,MEDI-STRIP,1/2X4: Brand: MEDLINE

## (undated) DEVICE — TUBING INSUFFLATION SMK EVAC HI FLO SET PNEUMOCLEAR

## (undated) DEVICE — SYR 10ML LUER LOK 1/5ML GRAD --

## (undated) DEVICE — C-ARM: Brand: UNBRANDED

## (undated) DEVICE — BUTTON SWITCH PENCIL BLADE ELECTRODE, HOLSTER: Brand: EDGE

## (undated) DEVICE — MASTISOL ADHESIVE LIQ 2/3ML

## (undated) DEVICE — INTRO PERC PER 8FRX3.5IN -- INTRADUCER

## (undated) DEVICE — BAG SPEC REM 224ML W4XL6IN DIA10MM 1 HND GYN DISP ENDOPCH

## (undated) DEVICE — CATHETER CHOLGM 4.5FR L18IN W/ MTL SUPP TB

## (undated) DEVICE — TROCARS: Brand: KII® BALLOON BLUNT TIP SYSTEM

## (undated) DEVICE — SUTURE VCRL SZ 3-0 L27IN ABSRB UD L26MM SH 1/2 CIR J416H

## (undated) DEVICE — REM POLYHESIVE ADULT PATIENT RETURN ELECTRODE: Brand: VALLEYLAB

## (undated) DEVICE — VISUALIZATION SYSTEM: Brand: CLEARIFY